# Patient Record
Sex: FEMALE
[De-identification: names, ages, dates, MRNs, and addresses within clinical notes are randomized per-mention and may not be internally consistent; named-entity substitution may affect disease eponyms.]

---

## 2017-01-12 PROBLEM — R51 HEADACHE: Status: ACTIVE | Noted: 2017-01-12

## 2017-01-12 PROBLEM — Z86.69 H/O HYDROCEPHALUS: Status: ACTIVE | Noted: 2017-01-12

## 2017-01-13 ENCOUNTER — APPOINTMENT (OUTPATIENT)
Dept: CT IMAGING | Facility: HOSPITAL | Age: 47
End: 2017-01-13

## 2017-01-13 ENCOUNTER — OUTPATIENT (OUTPATIENT)
Dept: OUTPATIENT SERVICES | Facility: HOSPITAL | Age: 47
LOS: 1 days | Discharge: HOME | End: 2017-01-13
Payer: COMMERCIAL

## 2017-01-13 DIAGNOSIS — Z86.69 PERSONAL HISTORY OF OTHER DISEASES OF THE NERVOUS SYSTEM AND SENSE ORGANS: ICD-10-CM

## 2017-01-13 PROCEDURE — 70450 CT HEAD/BRAIN W/O DYE: CPT | Mod: 26

## 2017-01-18 ENCOUNTER — APPOINTMENT (OUTPATIENT)
Dept: SPINE | Facility: CLINIC | Age: 47
End: 2017-01-18

## 2017-01-18 VITALS
HEIGHT: 67 IN | DIASTOLIC BLOOD PRESSURE: 90 MMHG | BODY MASS INDEX: 26.53 KG/M2 | WEIGHT: 169 LBS | SYSTOLIC BLOOD PRESSURE: 150 MMHG

## 2017-01-18 DIAGNOSIS — M54.2 CERVICALGIA: ICD-10-CM

## 2017-01-19 ENCOUNTER — TRANSCRIPTION ENCOUNTER (OUTPATIENT)
Age: 47
End: 2017-01-19

## 2017-08-09 ENCOUNTER — INPATIENT (INPATIENT)
Facility: HOSPITAL | Age: 47
LOS: 5 days | Discharge: HOME | End: 2017-08-15
Attending: INTERNAL MEDICINE | Admitting: INTERNAL MEDICINE
Payer: COMMERCIAL

## 2017-08-09 VITALS
DIASTOLIC BLOOD PRESSURE: 95 MMHG | HEART RATE: 78 BPM | RESPIRATION RATE: 18 BRPM | TEMPERATURE: 98 F | SYSTOLIC BLOOD PRESSURE: 185 MMHG | OXYGEN SATURATION: 98 %

## 2017-08-09 LAB
ALBUMIN SERPL ELPH-MCNC: 3.7 G/DL — SIGNIFICANT CHANGE UP (ref 3.3–5)
ALP SERPL-CCNC: 122 U/L — HIGH (ref 40–120)
ALT FLD-CCNC: 14 U/L — SIGNIFICANT CHANGE UP (ref 12–78)
ANION GAP SERPL CALC-SCNC: 22 MMOL/L — HIGH (ref 5–17)
APTT BLD: 35.4 SEC — SIGNIFICANT CHANGE UP (ref 27.5–37.4)
AST SERPL-CCNC: 21 U/L — SIGNIFICANT CHANGE UP (ref 15–37)
BASE EXCESS BLDA CALC-SCNC: -6.3 MMOL/L — LOW (ref -2–2)
BILIRUB SERPL-MCNC: 0.3 MG/DL — SIGNIFICANT CHANGE UP (ref 0.2–1.2)
BLOOD GAS COMMENTS: SIGNIFICANT CHANGE UP
BLOOD GAS COMMENTS: SIGNIFICANT CHANGE UP
BLOOD GAS SOURCE: SIGNIFICANT CHANGE UP
BUN SERPL-MCNC: 166 MG/DL — HIGH (ref 7–23)
CALCIUM SERPL-MCNC: 5.4 MG/DL — CRITICAL LOW (ref 8.5–10.1)
CHLORIDE SERPL-SCNC: 98 MMOL/L — SIGNIFICANT CHANGE UP (ref 96–108)
CO2 SERPL-SCNC: 18 MMOL/L — LOW (ref 22–31)
CREAT SERPL-MCNC: 20.6 MG/DL — HIGH (ref 0.5–1.3)
ERYTHROCYTE [SEDIMENTATION RATE] IN BLOOD: 74 MM/HR — HIGH (ref 0–15)
GLUCOSE SERPL-MCNC: 101 MG/DL — HIGH (ref 70–99)
HCG SERPL-ACNC: 3 MIU/ML — SIGNIFICANT CHANGE UP
HCO3 BLDA-SCNC: 18 MMOL/L — LOW (ref 21–29)
HCT VFR BLD CALC: 23.5 % — LOW (ref 34.5–45)
HGB BLD-MCNC: 7.3 G/DL — LOW (ref 11.5–15.5)
HOROWITZ INDEX BLDA+IHG-RTO: 21 — SIGNIFICANT CHANGE UP
INR BLD: 1.1 RATIO — SIGNIFICANT CHANGE UP (ref 0.88–1.16)
MAGNESIUM SERPL-MCNC: 1.8 MG/DL — SIGNIFICANT CHANGE UP (ref 1.6–2.6)
MCHC RBC-ENTMCNC: 27.3 PG — SIGNIFICANT CHANGE UP (ref 27–34)
MCHC RBC-ENTMCNC: 31.2 GM/DL — LOW (ref 32–36)
MCV RBC AUTO: 87.7 FL — SIGNIFICANT CHANGE UP (ref 80–100)
PCO2 BLDA: 32 MMHG — SIGNIFICANT CHANGE UP (ref 32–46)
PH BLD: 7.37 — SIGNIFICANT CHANGE UP (ref 7.35–7.45)
PLATELET # BLD AUTO: 148 K/UL — LOW (ref 150–400)
PO2 BLDA: 83 MMHG — SIGNIFICANT CHANGE UP (ref 74–108)
POTASSIUM SERPL-MCNC: 3.1 MMOL/L — LOW (ref 3.5–5.3)
POTASSIUM SERPL-SCNC: 3.1 MMOL/L — LOW (ref 3.5–5.3)
PROT SERPL-MCNC: 7 GM/DL — SIGNIFICANT CHANGE UP (ref 6–8.3)
PROTHROM AB SERPL-ACNC: 12 SEC — SIGNIFICANT CHANGE UP (ref 9.8–12.7)
RBC # BLD: 2.68 M/UL — LOW (ref 3.8–5.2)
RBC # FLD: 15.5 % — HIGH (ref 11–15)
SAO2 % BLDA: 96 % — SIGNIFICANT CHANGE UP (ref 92–96)
SODIUM SERPL-SCNC: 138 MMOL/L — SIGNIFICANT CHANGE UP (ref 135–145)
TROPONIN I SERPL-MCNC: 0.09 NG/ML — HIGH (ref 0.01–0.04)
WBC # BLD: 10 K/UL — SIGNIFICANT CHANGE UP (ref 3.8–10.5)
WBC # FLD AUTO: 10 K/UL — SIGNIFICANT CHANGE UP (ref 3.8–10.5)

## 2017-08-09 PROCEDURE — 99285 EMERGENCY DEPT VISIT HI MDM: CPT

## 2017-08-09 PROCEDURE — 70450 CT HEAD/BRAIN W/O DYE: CPT | Mod: 26

## 2017-08-09 PROCEDURE — 72125 CT NECK SPINE W/O DYE: CPT | Mod: 26

## 2017-08-09 PROCEDURE — 76377 3D RENDER W/INTRP POSTPROCES: CPT | Mod: 26

## 2017-08-09 PROCEDURE — 71010: CPT | Mod: 26

## 2017-08-09 RX ORDER — CALCIUM GLUCONATE 100 MG/ML
1 VIAL (ML) INTRAVENOUS ONCE
Qty: 1 | Refills: 0 | Status: COMPLETED | OUTPATIENT
Start: 2017-08-09 | End: 2017-08-09

## 2017-08-09 RX ORDER — ACETAMINOPHEN 500 MG
975 TABLET ORAL ONCE
Qty: 0 | Refills: 0 | Status: COMPLETED | OUTPATIENT
Start: 2017-08-09 | End: 2017-08-09

## 2017-08-09 RX ORDER — SODIUM CHLORIDE 9 MG/ML
1000 INJECTION INTRAMUSCULAR; INTRAVENOUS; SUBCUTANEOUS
Qty: 0 | Refills: 0 | Status: DISCONTINUED | OUTPATIENT
Start: 2017-08-09 | End: 2017-08-10

## 2017-08-09 RX ORDER — HYDRALAZINE HCL 50 MG
100 TABLET ORAL ONCE
Qty: 0 | Refills: 0 | Status: COMPLETED | OUTPATIENT
Start: 2017-08-09 | End: 2017-08-09

## 2017-08-09 RX ORDER — POTASSIUM CHLORIDE 20 MEQ
40 PACKET (EA) ORAL ONCE
Qty: 0 | Refills: 0 | Status: COMPLETED | OUTPATIENT
Start: 2017-08-09 | End: 2017-08-09

## 2017-08-09 RX ADMIN — Medication 975 MILLIGRAM(S): at 21:45

## 2017-08-09 RX ADMIN — Medication 200 GRAM(S): at 22:00

## 2017-08-09 RX ADMIN — Medication 975 MILLIGRAM(S): at 20:59

## 2017-08-09 RX ADMIN — SODIUM CHLORIDE 125 MILLILITER(S): 9 INJECTION INTRAMUSCULAR; INTRAVENOUS; SUBCUTANEOUS at 20:50

## 2017-08-09 NOTE — ED PROVIDER NOTE - OBJECTIVE STATEMENT
47 yo F with new onset seizure 1.5 hours ago, while waiting in doctor's office.  Pt. became unresponsive for a minute and was shaking as per witnesses.  Pt. says she's never had a seizure before.  She does admit she has a hx of renal disease and is s/p renal transplant, but she thinks the kidney might be failing.  She notes her pressure is also up because she is due for her evening bp meds.  No other complaints currently.  ROS: negative for fever, cough, headache, chest pain, shortness of breath, abd pain, nausea, vomiting, diarrhea, rash, paresthesia, and weakness.   PMH: CKD stage 5, s/p renal transplant, hypocalcemia, HTN, anemia; Meds: insulin, calcitriol, cholecalciferol, clonidine, darbepoetin aidee, diltiazem 360, furosemide 40, hydralazine 100, metolazone 2.5, metoprolol 50, mycophenolate mofetil 500 bid, spironolactone 25, tacrolimus 5 mg, ; SH: Denies smoking/drinking/drug use

## 2017-08-09 NOTE — ED PROVIDER NOTE - MEDICAL DECISION MAKING DETAILS
47 yo F with new onset seizure, concerning for kidney failure, electrolyte abnormality, stroke  -basic labs, blood gas, coag, hcg, mag level, esr, crp, trop  -ct brain, cervical  -tx htn, hydralazine, clonidine

## 2017-08-09 NOTE — ED ADULT NURSE REASSESSMENT NOTE - NS ED NURSE REASSESS COMMENT FT1
downtime unable to scan pt received potassium 40meq, hydralazine 100mg, clonidine 0.3, Tylenol 975mg, calcium gluconate

## 2017-08-09 NOTE — ED ADULT NURSE NOTE - OBJECTIVE STATEMENT
Pt is 47 y/o female pmh HTN, R kidney transplant (2005) BIBA for seizure at  office. Pt is A&Ox4 and denies hx of seizure.

## 2017-08-09 NOTE — ED PROVIDER NOTE - PHYSICAL EXAMINATION
Vitals: htn 210/110, otherwise wnl  Gen: AAOx3, NAD, sitting comfortably in stretcher, calm and cooperative  Head: ncat, perrla, eomi b/l  Neck: supple, no lymphadenopathy, no midline deviation  Heart: rrr, no m/r/g  Lungs: CTA b/l, no rales/ronchi/wheezes  Abd: soft, nontender, non-distended, no rebound or guarding  Ext: no clubbing/cyanosis/edema  Neuro: sensation and muscle strength intact b/l, steady gait

## 2017-08-10 DIAGNOSIS — N18.6 END STAGE RENAL DISEASE: ICD-10-CM

## 2017-08-10 LAB
ABO RH CONFIRMATION: SIGNIFICANT CHANGE UP
ALBUMIN SERPL ELPH-MCNC: 3 G/DL — LOW (ref 3.3–5)
ALP SERPL-CCNC: 102 U/L — SIGNIFICANT CHANGE UP (ref 40–120)
ALT FLD-CCNC: 13 U/L — SIGNIFICANT CHANGE UP (ref 12–78)
ANION GAP SERPL CALC-SCNC: 10 MMOL/L — SIGNIFICANT CHANGE UP (ref 5–17)
ANION GAP SERPL CALC-SCNC: 20 MMOL/L — HIGH (ref 5–17)
AST SERPL-CCNC: 18 U/L — SIGNIFICANT CHANGE UP (ref 15–37)
BILIRUB SERPL-MCNC: 0.3 MG/DL — SIGNIFICANT CHANGE UP (ref 0.2–1.2)
BLD GP AB SCN SERPL QL: SIGNIFICANT CHANGE UP
BUN SERPL-MCNC: 167 MG/DL — SIGNIFICANT CHANGE UP (ref 7–23)
BUN SERPL-MCNC: 82 MG/DL — HIGH (ref 7–23)
CALCIUM SERPL-MCNC: 6 MG/DL — CRITICAL LOW (ref 8.5–10.1)
CALCIUM SERPL-MCNC: 7 MG/DL — LOW (ref 8.4–10.5)
CALCIUM SERPL-MCNC: 7.7 MG/DL — LOW (ref 8.5–10.1)
CHLORIDE SERPL-SCNC: 100 MMOL/L — SIGNIFICANT CHANGE UP (ref 96–108)
CHLORIDE SERPL-SCNC: 105 MMOL/L — SIGNIFICANT CHANGE UP (ref 96–108)
CO2 SERPL-SCNC: 19 MMOL/L — LOW (ref 22–31)
CO2 SERPL-SCNC: 27 MMOL/L — SIGNIFICANT CHANGE UP (ref 22–31)
CREAT SERPL-MCNC: 10.3 MG/DL — HIGH (ref 0.5–1.3)
CREAT SERPL-MCNC: 20.5 MG/DL — HIGH (ref 0.5–1.3)
CRP SERPL-MCNC: 0.8 MG/DL — HIGH (ref 0–0.4)
FERRITIN SERPL-MCNC: 115 NG/ML — SIGNIFICANT CHANGE UP (ref 15–150)
GLUCOSE SERPL-MCNC: 154 MG/DL — HIGH (ref 70–99)
GLUCOSE SERPL-MCNC: 98 MG/DL — SIGNIFICANT CHANGE UP (ref 70–99)
HAV IGM SER-ACNC: SIGNIFICANT CHANGE UP
HBV CORE IGM SER-ACNC: SIGNIFICANT CHANGE UP
HBV SURFACE AG SER-ACNC: SIGNIFICANT CHANGE UP
HCT VFR BLD CALC: 18.4 % — CRITICAL LOW (ref 34.5–45)
HCT VFR BLD CALC: 19.8 % — CRITICAL LOW (ref 34.5–45)
HCV AB S/CO SERPL IA: 0.09 S/CO — SIGNIFICANT CHANGE UP
HCV AB SERPL-IMP: SIGNIFICANT CHANGE UP
HGB BLD-MCNC: 5.8 G/DL — CRITICAL LOW (ref 11.5–15.5)
HGB BLD-MCNC: 6.2 G/DL — CRITICAL LOW (ref 11.5–15.5)
HIV 1+2 AB+HIV1 P24 AG SERPL QL IA: SIGNIFICANT CHANGE UP
IRON SATN MFR SERPL: 31 UG/DL — SIGNIFICANT CHANGE UP (ref 30–160)
MAGNESIUM SERPL-MCNC: 1.9 MG/DL — SIGNIFICANT CHANGE UP (ref 1.6–2.6)
MCHC RBC-ENTMCNC: 27.3 PG — SIGNIFICANT CHANGE UP (ref 27–34)
MCHC RBC-ENTMCNC: 27.8 PG — SIGNIFICANT CHANGE UP (ref 27–34)
MCHC RBC-ENTMCNC: 31.3 GM/DL — LOW (ref 32–36)
MCHC RBC-ENTMCNC: 31.7 GM/DL — LOW (ref 32–36)
MCV RBC AUTO: 86.1 FL — SIGNIFICANT CHANGE UP (ref 80–100)
MCV RBC AUTO: 88.7 FL — SIGNIFICANT CHANGE UP (ref 80–100)
PHOSPHATE SERPL-MCNC: 6.4 MG/DL — HIGH (ref 2.5–4.5)
PHOSPHATE SERPL-MCNC: 6.5 MG/DL — HIGH (ref 2.5–4.5)
PLATELET # BLD AUTO: 122 K/UL — LOW (ref 150–400)
PLATELET # BLD AUTO: 134 K/UL — LOW (ref 150–400)
POTASSIUM SERPL-MCNC: 2.9 MMOL/L — CRITICAL LOW (ref 3.5–5.3)
POTASSIUM SERPL-MCNC: 3.2 MMOL/L — LOW (ref 3.5–5.3)
POTASSIUM SERPL-SCNC: 2.9 MMOL/L — CRITICAL LOW (ref 3.5–5.3)
POTASSIUM SERPL-SCNC: 3.2 MMOL/L — LOW (ref 3.5–5.3)
PROT SERPL-MCNC: 5.9 GM/DL — LOW (ref 6–8.3)
PTH-INTACT FLD-MCNC: 1467 PG/ML — HIGH (ref 15–65)
RBC # BLD: 2.14 M/UL — LOW (ref 3.8–5.2)
RBC # BLD: 2.23 M/UL — LOW (ref 3.8–5.2)
RBC # FLD: 15.2 % — HIGH (ref 11–15)
RBC # FLD: 15.3 % — HIGH (ref 11–15)
SODIUM SERPL-SCNC: 139 MMOL/L — SIGNIFICANT CHANGE UP (ref 135–145)
SODIUM SERPL-SCNC: 142 MMOL/L — SIGNIFICANT CHANGE UP (ref 135–145)
TRANSFERRIN SERPL-MCNC: 204 MG/DL — SIGNIFICANT CHANGE UP (ref 200–360)
WBC # BLD: 6.9 K/UL — SIGNIFICANT CHANGE UP (ref 3.8–10.5)
WBC # BLD: 6.9 K/UL — SIGNIFICANT CHANGE UP (ref 3.8–10.5)
WBC # FLD AUTO: 6.9 K/UL — SIGNIFICANT CHANGE UP (ref 3.8–10.5)
WBC # FLD AUTO: 6.9 K/UL — SIGNIFICANT CHANGE UP (ref 3.8–10.5)

## 2017-08-10 PROCEDURE — 36556 INSERT NON-TUNNEL CV CATH: CPT

## 2017-08-10 PROCEDURE — 71010: CPT | Mod: 26

## 2017-08-10 PROCEDURE — 93010 ELECTROCARDIOGRAM REPORT: CPT

## 2017-08-10 PROCEDURE — 74176 CT ABD & PELVIS W/O CONTRAST: CPT | Mod: 26

## 2017-08-10 RX ORDER — DESMOPRESSIN ACETATE 0.1 MG/1
0.2 TABLET ORAL ONCE
Qty: 0 | Refills: 0 | Status: DISCONTINUED | OUTPATIENT
Start: 2017-08-10 | End: 2017-08-10

## 2017-08-10 RX ORDER — CALCITRIOL 0.5 UG/1
0.5 CAPSULE ORAL DAILY
Qty: 0 | Refills: 0 | Status: DISCONTINUED | OUTPATIENT
Start: 2017-08-10 | End: 2017-08-15

## 2017-08-10 RX ORDER — DIPHENHYDRAMINE HCL 50 MG
50 CAPSULE ORAL ONCE
Qty: 0 | Refills: 0 | Status: DISCONTINUED | OUTPATIENT
Start: 2017-08-10 | End: 2017-08-10

## 2017-08-10 RX ORDER — MORPHINE SULFATE 50 MG/1
2 CAPSULE, EXTENDED RELEASE ORAL ONCE
Qty: 0 | Refills: 0 | Status: DISCONTINUED | OUTPATIENT
Start: 2017-08-10 | End: 2017-08-10

## 2017-08-10 RX ORDER — ONDANSETRON 8 MG/1
4 TABLET, FILM COATED ORAL ONCE
Qty: 0 | Refills: 0 | Status: COMPLETED | OUTPATIENT
Start: 2017-08-10 | End: 2017-08-10

## 2017-08-10 RX ORDER — ACETAMINOPHEN 500 MG
650 TABLET ORAL EVERY 6 HOURS
Qty: 0 | Refills: 0 | Status: DISCONTINUED | OUTPATIENT
Start: 2017-08-10 | End: 2017-08-15

## 2017-08-10 RX ORDER — DESMOPRESSIN ACETATE 0.1 MG/1
20 TABLET ORAL ONCE
Qty: 0 | Refills: 0 | Status: COMPLETED | OUTPATIENT
Start: 2017-08-10 | End: 2017-08-10

## 2017-08-10 RX ORDER — HYDROMORPHONE HYDROCHLORIDE 2 MG/ML
0.5 INJECTION INTRAMUSCULAR; INTRAVENOUS; SUBCUTANEOUS ONCE
Qty: 0 | Refills: 0 | Status: DISCONTINUED | OUTPATIENT
Start: 2017-08-10 | End: 2017-08-10

## 2017-08-10 RX ORDER — HYDRALAZINE HCL 50 MG
100 TABLET ORAL EVERY 8 HOURS
Qty: 0 | Refills: 0 | Status: DISCONTINUED | OUTPATIENT
Start: 2017-08-10 | End: 2017-08-10

## 2017-08-10 RX ORDER — HYDRALAZINE HCL 50 MG
7.5 TABLET ORAL EVERY 8 HOURS
Qty: 0 | Refills: 0 | Status: DISCONTINUED | OUTPATIENT
Start: 2017-08-10 | End: 2017-08-12

## 2017-08-10 RX ORDER — DIPHENHYDRAMINE HCL 50 MG
25 CAPSULE ORAL ONCE
Qty: 0 | Refills: 0 | Status: COMPLETED | OUTPATIENT
Start: 2017-08-10 | End: 2017-08-10

## 2017-08-10 RX ORDER — FUROSEMIDE 40 MG
20 TABLET ORAL DAILY
Qty: 0 | Refills: 0 | Status: DISCONTINUED | OUTPATIENT
Start: 2017-08-10 | End: 2017-08-12

## 2017-08-10 RX ORDER — HEPARIN SODIUM 5000 [USP'U]/ML
5000 INJECTION INTRAVENOUS; SUBCUTANEOUS EVERY 12 HOURS
Qty: 0 | Refills: 0 | Status: DISCONTINUED | OUTPATIENT
Start: 2017-08-10 | End: 2017-08-15

## 2017-08-10 RX ORDER — POTASSIUM CHLORIDE 20 MEQ
40 PACKET (EA) ORAL ONCE
Qty: 0 | Refills: 0 | Status: COMPLETED | OUTPATIENT
Start: 2017-08-10 | End: 2017-08-10

## 2017-08-10 RX ORDER — POTASSIUM CHLORIDE 20 MEQ
40 PACKET (EA) ORAL EVERY 4 HOURS
Qty: 0 | Refills: 0 | Status: DISCONTINUED | OUTPATIENT
Start: 2017-08-10 | End: 2017-08-10

## 2017-08-10 RX ORDER — TACROLIMUS 5 MG/1
5 CAPSULE ORAL
Qty: 0 | Refills: 0 | Status: DISCONTINUED | OUTPATIENT
Start: 2017-08-10 | End: 2017-08-15

## 2017-08-10 RX ORDER — METOPROLOL TARTRATE 50 MG
50 TABLET ORAL DAILY
Qty: 0 | Refills: 0 | Status: DISCONTINUED | OUTPATIENT
Start: 2017-08-10 | End: 2017-08-12

## 2017-08-10 RX ORDER — MYCOPHENOLATE MOFETIL 250 MG/1
500 CAPSULE ORAL
Qty: 0 | Refills: 0 | Status: DISCONTINUED | OUTPATIENT
Start: 2017-08-10 | End: 2017-08-11

## 2017-08-10 RX ORDER — DOXERCALCIFEROL 2.5 UG/1
3 CAPSULE ORAL ONCE
Qty: 0 | Refills: 0 | Status: COMPLETED | OUTPATIENT
Start: 2017-08-10 | End: 2017-08-10

## 2017-08-10 RX ORDER — SODIUM CHLORIDE 9 MG/ML
1000 INJECTION, SOLUTION INTRAVENOUS
Qty: 0 | Refills: 0 | Status: DISCONTINUED | OUTPATIENT
Start: 2017-08-10 | End: 2017-08-11

## 2017-08-10 RX ADMIN — Medication 40 MILLIEQUIVALENT(S): at 01:00

## 2017-08-10 RX ADMIN — HYDROMORPHONE HYDROCHLORIDE 0.5 MILLIGRAM(S): 2 INJECTION INTRAMUSCULAR; INTRAVENOUS; SUBCUTANEOUS at 21:38

## 2017-08-10 RX ADMIN — Medication 650 MILLIGRAM(S): at 14:06

## 2017-08-10 RX ADMIN — DOXERCALCIFEROL 3 MICROGRAM(S): 2.5 CAPSULE ORAL at 18:04

## 2017-08-10 RX ADMIN — Medication 25 MILLIGRAM(S): at 22:41

## 2017-08-10 RX ADMIN — Medication 650 MILLIGRAM(S): at 07:41

## 2017-08-10 RX ADMIN — SODIUM CHLORIDE 50 MILLILITER(S): 9 INJECTION, SOLUTION INTRAVENOUS at 01:49

## 2017-08-10 RX ADMIN — DESMOPRESSIN ACETATE 220 MICROGRAM(S): 0.1 TABLET ORAL at 22:24

## 2017-08-10 RX ADMIN — SODIUM CHLORIDE 50 MILLILITER(S): 9 INJECTION, SOLUTION INTRAVENOUS at 06:41

## 2017-08-10 RX ADMIN — TACROLIMUS 5 MILLIGRAM(S): 5 CAPSULE ORAL at 06:40

## 2017-08-10 RX ADMIN — Medication 50 MILLIGRAM(S): at 06:40

## 2017-08-10 RX ADMIN — Medication 0.1 MILLIGRAM(S): at 06:41

## 2017-08-10 RX ADMIN — Medication 650 MILLIGRAM(S): at 06:41

## 2017-08-10 RX ADMIN — Medication 100 MILLIGRAM(S): at 21:38

## 2017-08-10 RX ADMIN — MORPHINE SULFATE 2 MILLIGRAM(S): 50 CAPSULE, EXTENDED RELEASE ORAL at 12:43

## 2017-08-10 RX ADMIN — CALCITRIOL 0.5 MICROGRAM(S): 0.5 CAPSULE ORAL at 14:05

## 2017-08-10 RX ADMIN — MORPHINE SULFATE 2 MILLIGRAM(S): 50 CAPSULE, EXTENDED RELEASE ORAL at 12:20

## 2017-08-10 RX ADMIN — Medication 40 MILLIEQUIVALENT(S): at 10:25

## 2017-08-10 RX ADMIN — Medication 0.3 MILLIGRAM(S): at 00:59

## 2017-08-10 RX ADMIN — MYCOPHENOLATE MOFETIL 500 MILLIGRAM(S): 250 CAPSULE ORAL at 17:31

## 2017-08-10 RX ADMIN — Medication 650 MILLIGRAM(S): at 15:00

## 2017-08-10 RX ADMIN — Medication 100 MILLIGRAM(S): at 01:00

## 2017-08-10 RX ADMIN — MYCOPHENOLATE MOFETIL 500 MILLIGRAM(S): 250 CAPSULE ORAL at 06:40

## 2017-08-10 RX ADMIN — HEPARIN SODIUM 5000 UNIT(S): 5000 INJECTION INTRAVENOUS; SUBCUTANEOUS at 17:32

## 2017-08-10 RX ADMIN — Medication 25 MILLIGRAM(S): at 21:50

## 2017-08-10 RX ADMIN — TACROLIMUS 5 MILLIGRAM(S): 5 CAPSULE ORAL at 17:31

## 2017-08-10 RX ADMIN — Medication 650 MILLIGRAM(S): at 20:22

## 2017-08-10 RX ADMIN — HYDROMORPHONE HYDROCHLORIDE 0.5 MILLIGRAM(S): 2 INJECTION INTRAMUSCULAR; INTRAVENOUS; SUBCUTANEOUS at 21:53

## 2017-08-10 RX ADMIN — Medication 100 MILLIGRAM(S): at 06:41

## 2017-08-10 RX ADMIN — Medication 20 MILLIGRAM(S): at 06:41

## 2017-08-10 RX ADMIN — ONDANSETRON 4 MILLIGRAM(S): 8 TABLET, FILM COATED ORAL at 23:01

## 2017-08-10 RX ADMIN — HEPARIN SODIUM 5000 UNIT(S): 5000 INJECTION INTRAVENOUS; SUBCUTANEOUS at 06:41

## 2017-08-10 RX ADMIN — Medication 0.1 MILLIGRAM(S): at 17:31

## 2017-08-10 RX ADMIN — Medication 650 MILLIGRAM(S): at 21:23

## 2017-08-10 RX ADMIN — Medication 100 MILLIGRAM(S): at 14:09

## 2017-08-10 NOTE — CONSULT NOTE ADULT - SUBJECTIVE AND OBJECTIVE BOX
HPI:  She has had a  donor renal transplant for 11 years.  ESRD is due to HTN.  She has no other significant medical history.  She states the kidney has been failing lately and she has been urged to resume dialysis by her nephrologist, Dr. Donald Cali, for past 2 months or so.  She was in his office today when she started to have shaking and EMS brought her to ER where she arrived awake and coherent and there was no further seizure like activity.  SBP was in 220/110 mm Hg.  She cannot say what her recent creatinine levels were except that it was "very high".  She was also severely hypocalcemic.  She is on calcitriol for this but no calcium supplementation.   She denies any history of acute rejection or any recent renal biopsies.       ROS:  She denies CP, SOB, HA, dizziness, syncope, abdominal pain and admits to anorexia, 5# weight loss, nausea, vomiting, and metallic taste.  A total of 10 systems were reviewed and remainder are negative.      PAST MEDICAL & SURGICAL HISTORY:  History of renal transplant  ESRD (end stage renal disease)  HTN - Hypertension  Tubal ligation status: 12 years ago  History of renal transplant      SOCIAL HISTORY:  never smoked  nursing degree    FAMILY HISTORY:  no kidney disease, but HTN runs in 1st degree relatives      MEDICATIONS  (STANDING):  acetaminophen   Tablet. 975 milliGRAM(s) Oral once  hydrALAZINE 100 milliGRAM(s) Oral Once  cloNIDine 0.3 milliGRAM(s) Oral Once  sodium chloride 0.9%. 1000 milliLiter(s) (125 mL/Hr) IV Continuous <Continuous>  calcium gluconate IVPB 1 Gram(s) IV Intermittent Once  potassium chloride    Tablet ER 40 milliEquivalent(s) Oral once      PHYSICAL EXAMINATION:  T(F): 98 (17 @ 22:57)  HR: 78 (17 @ 22:57)  BP: 185/95 (17 @ 22:57)  RR: 18 (17 @ 22:57)  SpO2: 98% (17 @ 22:57)  Conversant, no apparent distress  PERRLA, pink conjunctivae, no ptosis  Good dentition, no pharyngeal erythema, uremic fetor is present  Neck non tender, no mass, no thyromegaly or nodules  Normal respiratory effort, lungs clear to auscultation  Heart with RRR, loud systolic murmur,no pericardial rub, 1+ pretibial edema  Abdomen soft, no masses, no organomegaly  Skin no rashes, ulcers or lesions, normal turgor and temperature  Appropriate affect, AO x 3  No asterixis or myoclonic jerks    LABS:                        7.3    10.0  )-----------( 148      ( 09 Aug 2017 21:01 )             23.5         138  |  98  |  166<H>  ----------------------------<  101<H>  3.1<L>   |  18<L>  |  20.60<H>    Ca    5.4<LL>      09 Aug 2017 21:01  Mg     1.8         TPro  7.0  /  Alb  3.7  /  TBili  0.3  /  DBili  x   /  AST  21  /  ALT  14  /  AlkPhos  122<H>        RADIOLOGY:  Chest X-Ray personally reviewed and shows slight prominence of pulmonary vasculature, slight prominence of cardiac silohette  CT brain shows no acute abnormality    ASSESSMENT:  1.  Renal transplant failure, chronic by history  2.  Uncontrolled HTN  3.  Severe hypocalcemia  4.  Seizure probably most due to #2 and #3    PLAN:  She was dosed with oral hydralazine and clonidine and BP is moderating; continue to dose these orally;  goal -170 mm Hg for tonight is reasonable  Continue to supplement calcium intravenously as ordered  Discontinue saline  Continue anti rejection drugs at usual dosages  She has agreed to start dialysis;  please arrange for nasra catheter placement over night so she be dialyzed early tomorrow  Vascular surgery can be called tomorrow for tunneled dialysis catheter and AV access placement (she prefers HD > PD)            This consultation involves high risk medical decision making as evidenced by a severe exacerbation of chronic illness, illness with threat to life or bodily function, decision for DNR or to de-escalate care, or use of drugs that require intensive monitoring for toxicity.

## 2017-08-10 NOTE — H&P ADULT - HISTORY OF PRESENT ILLNESS
47 Y/O female w/ PMHx of HTN, CRF s/p kidney transplant 2005 presents s/p seizure x3 at doctors office. Pt reports no PMHx of seizures, and states that she doesn't recall how she felt prior to or recall having the seizures. She states that she had increased weakness and dizziness throughout the week. According to the pt, she was visiting her doctors office because they were planning to put her on HD. ICU called to assist with new HD.

## 2017-08-10 NOTE — CHART NOTE - NSCHARTNOTEFT_GEN_A_CORE
Patient is a 46y old  Female who presents with a chief complaint of c/o new onset seizure (10 Aug 2017 05:09). Contacted by RN for reaction 7 minutes status post IV Dilaudid administered to Right antecubital heplock. Patient with restlessness, faint, moderate SOB and flushing.      HPI:  45 Y/O female w/ PMHx of HTN, CRF s/p kidney transplant 2005 presents s/p seizure x3 at doctors office. Pt reports no PMHx of seizures, and states that she doesn't recall how she felt prior to or recall having the seizures. She states that she had increased weakness and dizziness throughout the week. According to the pt, she was visiting her doctors office because they were planning to put her on HD. ICU called to assist with new HD. (10 Aug 2017 03:52)      Allergies    Dilaudid (Flushing (Severe); Short breath (Mild to Mod); Faint (Mod to Severe))    Intolerances        MEDICATIONS  (STANDING):  dextrose 5% 1000 milliLiter(s) (50 mL/Hr) IV Continuous <Continuous>  calcitriol   Capsule 0.5 MICROGram(s) Oral daily  mycophenolate mofetil 500 milliGRAM(s) Oral two times a day  tacrolimus 5 milliGRAM(s) Oral two times a day  cloNIDine 0.1 milliGRAM(s) Oral two times a day  hydrALAZINE 100 milliGRAM(s) Oral every 8 hours  metoprolol succinate ER 50 milliGRAM(s) Oral daily  furosemide    Tablet 20 milliGRAM(s) Oral daily  heparin  Injectable 5000 Unit(s) SubCutaneous every 12 hours  desmopressin IVPB 20 MICROGram(s) IV Intermittent once    MEDICATIONS  (PRN):  acetaminophen   Tablet. 650 milliGRAM(s) Oral every 6 hours PRN Mild Pain (1 - 3)  acetaminophen   Tablet. 650 milliGRAM(s) Oral every 6 hours PRN Mild Pain (1 - 3)      Drug Dosing Weight  Height (cm): 170.2 (10 Aug 2017 04:50)  Weight (kg): 73.5 (10 Aug 2017 04:50)  BMI (kg/m2): 25.4 (10 Aug 2017 04:50)  BSA (m2): 1.85 (10 Aug 2017 04:50)    PAST MEDICAL & SURGICAL HISTORY:  Cerebral edema  History of renal transplant  ESRD (end stage renal disease)  HTN - Hypertension  Tubal ligation status: 12 years ago  History of renal transplant      REVIEW OF SYSTEMS      General:	restless, flushing    Skin/Breast: + puritus, denies rash  	  Respiratory and Thorax: denies wheezing  	  Cardiovascular:	[id number] chest pain          ICU Vital Signs Last 24 Hrs  T(C): 36.8 (10 Aug 2017 19:25), Max: 37.2 (10 Aug 2017 15:10)  T(F): 98.2 (10 Aug 2017 19:25), Max: 99 (10 Aug 2017 15:10)  HR: 72 (10 Aug 2017 21:00) (62 - 86)  BP: 177/99 (10 Aug 2017 21:00) (155/94 - 194/108)  BP(mean): 118 (10 Aug 2017 21:00) (102 - 130)  ABP: --  ABP(mean): --  RR: 22 (10 Aug 2017 21:00) (10 - 22)  SpO2: 97% (10 Aug 2017 21:00) (96% - 100%)                I&O's Detail    09 Aug 2017 07:01  -  10 Aug 2017 07:00  --------------------------------------------------------  IN:    dextrose 5%: 200 mL  Total IN: 200 mL    OUT:  Total OUT: 0 mL    Total NET: 200 mL      10 Aug 2017 07:01  -  10 Aug 2017 22:19  --------------------------------------------------------  IN:    dextrose 5%: 750 mL    Oral Fluid: 225 mL    Other: 1150 mL    Packed Red Blood Cells: 320 mL  Total IN: 2445 mL    OUT:    Other: 1150 mL    Voided: 500 mL  Total OUT: 1650 mL    Total NET: 795 mL          PHYSICAL EXAM:      Constitutional: anxious    Eyes: no periorbital edema    ENMT: EOMI, pharynx clear, right IJ  shiley with clotting, moderate bleeding    Neck: supple      Respiratory: clear to auscultation    Cardiovascular: S1/S2      Extremities: no edema    Vascular: positive pedal pulses      Skin: warm intact          LABS:  CBC Full  -  ( 10 Aug 2017 07:10 )  WBC Count : 6.9 K/uL  Hemoglobin : 6.2 g/dL  Hematocrit : 19.8 %  Platelet Count - Automated : 134 K/uL  Mean Cell Volume : 88.7 fl  Mean Cell Hemoglobin : 27.8 pg  Mean Cell Hemoglobin Concentration : 31.3 gm/dL      08-10    142  |  105  |  82<H>  ----------------------------<  154<H>  3.2<L>   |  27  |  10.30<H>    Ca    7.7<L>      10 Aug 2017 19:27  Phos  6.5     08-10  Mg     1.9     08-10    TPro  5.9<L>  /  Alb  3.0<L>  /  TBili  0.3  /  DBili  x   /  AST  18  /  ALT  13  /  AlkPhos  102  08-10    CAPILLARY BLOOD GLUCOSE        PT/INR - ( 09 Aug 2017 21:01 )   PT: 12.0 sec;   INR: 1.10 ratio         PTT - ( 09 Aug 2017 21:01 )  PTT:35.4 sec        CRITICAL CARE TIME SPENT: 20 minutes      Allergic drug reaction- Benadryl 25 mg IVP                                   d/c dilaudid                                  urine for immunofixation    Uremic Bleeding- Pressure dressing                            DDVAP ordered by critical cxare intensivist

## 2017-08-10 NOTE — H&P ADULT - PROBLEM SELECTOR PLAN 1
1. Admit pt to CCU under Dr. Elias hoover/ hospitalist service for medicine.  2. f/u renal consult from Dr. Wong  3. initiate HD  4. Continue home BP meds  5. repeat labs, replace electrolytes as needed

## 2017-08-10 NOTE — PROCEDURE NOTE - NSPOSTPRCRAD_GEN_A_CORE
central line located in the superior vena cava/depth of insertion/central line located in the/no pneumothorax/post-procedure radiography performed

## 2017-08-10 NOTE — H&P ADULT - ASSESSMENT
45 Y/O female w/ hx of CRF s/p kidney transplant which pt states has been failing for 2 months, requiring HD. Pt noted to be hypocalcemic so calcium ordered by nephrologist in ED.  BP noted to be elevated to 210/110 in ED.

## 2017-08-10 NOTE — H&P ADULT - ATTENDING COMMENTS
47 yo female w/ hx HTN, CKD s/p kidney transplant 2005 admitted for seizures. Pt with worsening renal transplant function and has also been hypertensive most recently . Had seizures most likely to HTN encephalopathy or uremic encephalopathy given degree of renal dysfunction. Pt doing well now and BP controlled with medications. HD cath being placed and plan for HD today. Cont BP meds. Cont immunosuppression

## 2017-08-10 NOTE — H&P ADULT - NSHPPHYSICALEXAM_GEN_ALL_CORE
PHYSICAL EXAM:    GENERAL: NAD, well-groomed, well-developed  HEAD:  Atraumatic, Normocephalic  EYES: EOMI, PERRLA, conjunctiva and sclera clear  ENMT: No tonsillar erythema, exudates, or enlargement; Moist mucous membranes, Good dentition, No lesions  NECK: Supple, No JVD, Normal thyroid  NERVOUS SYSTEM:  Alert & Oriented X3, Good concentration; Motor Strength 5/5 B/L upper and lower extremities; DTRs 2+ intact and symmetric  CHEST/LUNG: Clear to percussion bilaterally; No rales, rhonchi, wheezing, or rubs  HEART: Regular rate and rhythm; No murmurs, rubs, or gallops  ABDOMEN: Soft, Nontender, Nondistended; Bowel sounds present  EXTREMITIES:  2+ Peripheral Pulses, No clubbing, cyanosis, or edema  LYMPH: No lymphadenopathy noted  SKIN: No rashes or lesions PHYSICAL EXAM:    GENERAL: NAD, well-groomed, well-developed  HEAD:  Atraumatic, Normocephalic  EYES: EOMI, PERRLA, conjunctiva and sclera clear  ENMT: No tonsillar erythema, exudates, or enlargement; Moist mucous membranes, Good dentition, No lesions  NECK: Supple, No JVD, Normal thyroid  NERVOUS SYSTEM:  Alert & Oriented X3, Good concentration  CHEST/LUNG: Clear to percussion bilaterally; No rales, rhonchi, wheezing, or rubs  HEART: Regular rate and rhythm; No murmurs, rubs, or gallops  ABDOMEN: Soft, Nontender, Nondistended; Bowel sounds present  EXTREMITIES:  2+ Peripheral Pulses, No clubbing, cyanosis, or edema

## 2017-08-10 NOTE — PROCEDURE NOTE - NSPROCDETAILS_GEN_ALL_CORE
sterile dressing applied/lumen(s) aspirated and flushed/ultrasound guidance/guidewire recovered/sterile technique, catheter placed

## 2017-08-10 NOTE — H&P ADULT - PMH
Cerebral edema    ESRD (end stage renal disease)    History of renal transplant    HTN - Hypertension

## 2017-08-10 NOTE — H&P ADULT - NSHPREVIEWOFSYSTEMS_GEN_ALL_CORE
REVIEW OF SYSTEMS:    CONSTITUTIONAL: + generalized weakness  EYES: No eye pain, visual disturbances, or discharge  ENMT:  No difficulty hearing, tinnitus, vertigo; No sinus or throat pain  NECK: No pain or stiffness  BREASTS: No pain, masses, or nipple discharge  RESPIRATORY: No cough, wheezing, chills or hemoptysis; No shortness of breath  CARDIOVASCULAR: No chest pain, palpitations, dizziness, or leg swelling  GASTROINTESTINAL: No abdominal or epigastric pain. No nausea, vomiting, or hematemesis; No diarrhea or constipation. No melena or hematochezia.  GENITOURINARY: No dysuria, frequency, hematuria, or incontinence  NEUROLOGICAL: No headaches, memory loss, loss of strength, numbness, or tremors  SKIN: No itching, burning, rashes, or lesions   LYMPH NODES: No enlarged glands REVIEW OF SYSTEMS:    CONSTITUTIONAL: + generalized weakness  EYES: No eye pain, visual disturbances, or discharge  ENMT:  No difficulty hearing, tinnitus, vertigo; No sinus or throat pain  NECK: No pain or stiffness  BREASTS: No pain, masses, or nipple discharge  RESPIRATORY: No cough, wheezing, chills or hemoptysis; No shortness of breath  CARDIOVASCULAR: No chest pain, palpitations, dizziness, or leg swelling  GASTROINTESTINAL: No abdominal or epigastric pain. No nausea, vomiting, or hematemesis; No diarrhea or constipation. No melena or hematochezia.  GENITOURINARY: No dysuria, frequency, hematuria, or incontinence  NEUROLOGICAL: + dizziness, no memory loss, loss of strength, numbness, or tremors  SKIN: No itching, burning, rashes, or lesions   LYMPH NODES: No enlarged glands

## 2017-08-10 NOTE — PROCEDURE NOTE - NSPOSTCAREGUIDE_GEN_A_CORE
Instructed patient/caregiver regarding signs and symptoms of infection/Verbal/written post procedure instructions were given to patient/caregiver/Care for catheter as per unit/ICU protocols/Keep the cast/splint/dressing clean and dry

## 2017-08-10 NOTE — PROGRESS NOTE ADULT - SUBJECTIVE AND OBJECTIVE BOX
Subjective: 45yo F presented last night after a witnessed episode of seizure like activity while in the office of Dr Cali/Nephrology. Has an 11 yo cadaveric renal transplant that has been failing progressively over past few months. She has been experiencing persistent nausea, fatigue.  Transplant Bx was not entertained as a diagnostic modality. Severely hypertensive in ED. Bun/Cr 160/20.   BP control has improved back on reg meds. No nausea at present. C/o fatigue        MEDICATIONS  (STANDING):  dextrose 5% 1000 milliLiter(s) (50 mL/Hr) IV Continuous <Continuous>  calcitriol   Capsule 0.5 MICROGram(s) Oral daily  mycophenolate mofetil 500 milliGRAM(s) Oral two times a day  tacrolimus 5 milliGRAM(s) Oral two times a day  cloNIDine 0.1 milliGRAM(s) Oral two times a day  hydrALAZINE 100 milliGRAM(s) Oral every 8 hours  metoprolol succinate ER 50 milliGRAM(s) Oral daily  furosemide    Tablet 20 milliGRAM(s) Oral daily  heparin  Injectable 5000 Unit(s) SubCutaneous every 12 hours  potassium chloride    Tablet ER 40 milliEquivalent(s) Oral every 4 hours    MEDICATIONS  (PRN):  acetaminophen   Tablet. 650 milliGRAM(s) Oral every 6 hours PRN Mild Pain (1 - 3)          T(C): 36.4 (08-10-17 @ 07:48), Max: 37.1 (08-10-17 @ 02:00)  HR: 74 (08-10-17 @ 07:40) (64 - 82)  BP: 170/90 (08-10-17 @ 07:30) (168/89 - 185/95)  RR: 13 (08-10-17 @ 07:40) (10 - 21)  SpO2: 100% (08-10-17 @ 07:40) (97% - 100%)  Wt(kg): --    ABG - ( 09 Aug 2017 21:05 )  pH: x     /  pCO2: 32    /  pO2: 83    / HCO3: 18    / Base Excess: -6.3  /  SaO2: 96                  I&O's Detail    09 Aug 2017 07:01  -  10 Aug 2017 07:00  --------------------------------------------------------  IN:    dextrose 5%: 200 mL  Total IN: 200 mL    OUT:  Total OUT: 0 mL    Total NET: 200 mL               PHYSICAL EXAM:    GENERAL: anxious  EYES: EOMI, PERRLA, conjunctiva and sclera clear  NECK: Supple, no inc in JVP  CHEST/LUNG: Clear  HEART: S1S2, pos rub  ABDOMEN: Soft, non-tender R pelvic allograft  EXTREMITIES:  no edema  NEURO: pos asterixis      LABS:  CBC Full  -  ( 10 Aug 2017 07:10 )  WBC Count : 6.9 K/uL  Hemoglobin : 6.2 g/dL  Hematocrit : 19.8 %  Platelet Count - Automated : 134 K/uL  Mean Cell Volume : 88.7 fl  Mean Cell Hemoglobin : 27.8 pg  Mean Cell Hemoglobin Concentration : 31.3 gm/dL  Auto Neutrophil # : x  Auto Lymphocyte # : x  Auto Monocyte # : x  Auto Eosinophil # : x  Auto Basophil # : x  Auto Neutrophil % : x  Auto Lymphocyte % : x  Auto Monocyte % : x  Auto Eosinophil % : x  Auto Basophil % : x    08-10    139  |  100  |  167  ----------------------------<  98  2.9<LL>   |  19<L>  |  20.50<H>    Ca    6.0<LL>      10 Aug 2017 05:19  Phos  6.4     08-10  Mg     1.9     08-10    TPro  5.9<L>  /  Alb  3.0<L>  /  TBili  0.3  /  DBili  x   /  AST  18  /  ALT  13  /  AlkPhos  102  08-10    PT/INR - ( 09 Aug 2017 21:01 )   PT: 12.0 sec;   INR: 1.10 ratio         PTT - ( 09 Aug 2017 21:01 )  PTT:35.4 sec        ASSESSMENT and PLAN:    * Failed cadaveric transplant, uremic, hypertensive seizure -- resume HD via temporary access. 2.5H tx today. No UF. Obtain non-contrast CT A/P to eval renal anatomy. Will contact Dr Cali to obtain renal hx [phone])  * HypoK -- avoid aggressive supplementation in ESRD. Will give 20meq KCL once. Adjust K bath in HD  * Hypertensive emergency -- BP improved and acceptable at this time. Maintain -180 for next 24h  * HypoCa of ESRD -- cont to supplement PRN. Start Vit D with HD. Check PTH, Phos  * Anemia of ESRD -- check Fe studies, SPEP, UPEP. Transfuse with dialysis today. SERG once BP control is consistently better.

## 2017-08-11 LAB
% ALBUMIN: 59.6 % — SIGNIFICANT CHANGE UP
% ALPHA 1: 6.5 % — SIGNIFICANT CHANGE UP
% ALPHA 2: 10.3 % — SIGNIFICANT CHANGE UP
% BETA: 9.6 % — SIGNIFICANT CHANGE UP
% GAMMA: 14 % — SIGNIFICANT CHANGE UP
ALBUMIN SERPL ELPH-MCNC: 3.3 G/DL — LOW (ref 3.6–5.5)
ALBUMIN/GLOB SERPL ELPH: 1.5 RATIO — SIGNIFICANT CHANGE UP
ALPHA1 GLOB SERPL ELPH-MCNC: 0.4 G/DL — SIGNIFICANT CHANGE UP (ref 0.1–0.4)
ALPHA2 GLOB SERPL ELPH-MCNC: 0.6 G/DL — SIGNIFICANT CHANGE UP (ref 0.5–1)
ANION GAP SERPL CALC-SCNC: 14 MMOL/L — SIGNIFICANT CHANGE UP (ref 5–17)
B-GLOBULIN SERPL ELPH-MCNC: 0.5 G/DL — SIGNIFICANT CHANGE UP (ref 0.5–1)
BUN SERPL-MCNC: 96 MG/DL — HIGH (ref 7–23)
CALCIUM SERPL-MCNC: 7.4 MG/DL — LOW (ref 8.5–10.1)
CHLORIDE SERPL-SCNC: 104 MMOL/L — SIGNIFICANT CHANGE UP (ref 96–108)
CO2 SERPL-SCNC: 24 MMOL/L — SIGNIFICANT CHANGE UP (ref 22–31)
CREAT SERPL-MCNC: 13.7 MG/DL — HIGH (ref 0.5–1.3)
GAMMA GLOBULIN: 0.8 G/DL — SIGNIFICANT CHANGE UP (ref 0.6–1.6)
GLUCOSE SERPL-MCNC: 95 MG/DL — SIGNIFICANT CHANGE UP (ref 70–99)
HCT VFR BLD CALC: 22 % — LOW (ref 34.5–45)
HGB BLD-MCNC: 7 G/DL — CRITICAL LOW (ref 11.5–15.5)
INR BLD: 1.22 RATIO — HIGH (ref 0.88–1.16)
MAGNESIUM SERPL-MCNC: 1.9 MG/DL — SIGNIFICANT CHANGE UP (ref 1.6–2.6)
MCHC RBC-ENTMCNC: 27.8 PG — SIGNIFICANT CHANGE UP (ref 27–34)
MCHC RBC-ENTMCNC: 32.1 GM/DL — SIGNIFICANT CHANGE UP (ref 32–36)
MCV RBC AUTO: 86.8 FL — SIGNIFICANT CHANGE UP (ref 80–100)
PHOSPHATE SERPL-MCNC: 4.4 MG/DL — SIGNIFICANT CHANGE UP (ref 2.5–4.5)
PLATELET # BLD AUTO: 134 K/UL — LOW (ref 150–400)
POTASSIUM SERPL-MCNC: 3.3 MMOL/L — LOW (ref 3.5–5.3)
POTASSIUM SERPL-SCNC: 3.3 MMOL/L — LOW (ref 3.5–5.3)
PROT ?TM UR-MCNC: 332 MG/DL — HIGH (ref 0–12)
PROT PATTERN SERPL ELPH-IMP: SIGNIFICANT CHANGE UP
PROT SERPL-MCNC: 5.5 G/DL — LOW (ref 6–8.3)
PROT SERPL-MCNC: 5.5 G/DL — LOW (ref 6–8.3)
PROTHROM AB SERPL-ACNC: 13.3 SEC — HIGH (ref 9.8–12.7)
RBC # BLD: 2.53 M/UL — LOW (ref 3.8–5.2)
RBC # FLD: 15.7 % — HIGH (ref 11–15)
SODIUM SERPL-SCNC: 142 MMOL/L — SIGNIFICANT CHANGE UP (ref 135–145)
WBC # BLD: 7.2 K/UL — SIGNIFICANT CHANGE UP (ref 3.8–10.5)
WBC # FLD AUTO: 7.2 K/UL — SIGNIFICANT CHANGE UP (ref 3.8–10.5)

## 2017-08-11 PROCEDURE — 99291 CRITICAL CARE FIRST HOUR: CPT

## 2017-08-11 PROCEDURE — 70551 MRI BRAIN STEM W/O DYE: CPT | Mod: 26

## 2017-08-11 PROCEDURE — 99232 SBSQ HOSP IP/OBS MODERATE 35: CPT

## 2017-08-11 RX ORDER — LABETALOL HCL 100 MG
20 TABLET ORAL ONCE
Qty: 0 | Refills: 0 | Status: COMPLETED | OUTPATIENT
Start: 2017-08-11 | End: 2017-08-11

## 2017-08-11 RX ORDER — LABETALOL HCL 100 MG
20 TABLET ORAL ONCE
Qty: 0 | Refills: 0 | Status: DISCONTINUED | OUTPATIENT
Start: 2017-08-11 | End: 2017-08-11

## 2017-08-11 RX ADMIN — Medication 20 MILLIGRAM(S): at 05:28

## 2017-08-11 RX ADMIN — Medication 20 MILLIGRAM(S): at 11:19

## 2017-08-11 RX ADMIN — TACROLIMUS 5 MILLIGRAM(S): 5 CAPSULE ORAL at 17:26

## 2017-08-11 RX ADMIN — Medication 650 MILLIGRAM(S): at 22:46

## 2017-08-11 RX ADMIN — Medication 650 MILLIGRAM(S): at 23:16

## 2017-08-11 RX ADMIN — Medication 0.1 MILLIGRAM(S): at 17:26

## 2017-08-11 RX ADMIN — HEPARIN SODIUM 5000 UNIT(S): 5000 INJECTION INTRAVENOUS; SUBCUTANEOUS at 17:27

## 2017-08-11 RX ADMIN — Medication 7.5 MILLIGRAM(S): at 22:06

## 2017-08-11 RX ADMIN — Medication 50 MILLIGRAM(S): at 05:28

## 2017-08-11 RX ADMIN — MYCOPHENOLATE MOFETIL 500 MILLIGRAM(S): 250 CAPSULE ORAL at 05:28

## 2017-08-11 RX ADMIN — Medication 20 MILLIGRAM(S): at 09:17

## 2017-08-11 RX ADMIN — CALCITRIOL 0.5 MICROGRAM(S): 0.5 CAPSULE ORAL at 12:19

## 2017-08-11 RX ADMIN — TACROLIMUS 5 MILLIGRAM(S): 5 CAPSULE ORAL at 05:28

## 2017-08-11 RX ADMIN — Medication 650 MILLIGRAM(S): at 10:08

## 2017-08-11 RX ADMIN — Medication 7.5 MILLIGRAM(S): at 05:28

## 2017-08-11 RX ADMIN — Medication 650 MILLIGRAM(S): at 11:00

## 2017-08-11 RX ADMIN — Medication 0.1 MILLIGRAM(S): at 05:28

## 2017-08-11 NOTE — CHART NOTE - NSCHARTNOTEFT_GEN_A_CORE
47 Y/O female w/ PMHx of HTN, CRF s/p kidney transplant 2005 presents s/p seizure x3 at doctors office. Pt reports no PMHx of seizures, and states that she doesn't recall how she felt prior to or recall having the seizures. She states that she had increased weakness and dizziness throughout the week. According to the pt, she was visiting her doctors office because they were planning to put her on HD because kidney has been failing x 2 months. ICU called to assist with new HD.     HD initiated while in CCU. Monitoring BP.  Pt placed for transfer to medicine. Pt signed out to Dr. Hernandez.

## 2017-08-11 NOTE — PROGRESS NOTE ADULT - SUBJECTIVE AND OBJECTIVE BOX
Patient seen in follow up for LALITA; CKD 4-5; feels better; no distress.    MEDICATIONS  (STANDING):  calcitriol   Capsule 0.5 MICROGram(s) Oral daily  tacrolimus 5 milliGRAM(s) Oral two times a day  cloNIDine 0.1 milliGRAM(s) Oral two times a day  metoprolol succinate ER 50 milliGRAM(s) Oral daily  furosemide    Tablet 20 milliGRAM(s) Oral daily  heparin  Injectable 5000 Unit(s) SubCutaneous every 12 hours  hydrALAZINE Injectable 7.5 milliGRAM(s) IV Push every 8 hours    MEDICATIONS  (PRN):  acetaminophen   Tablet. 650 milliGRAM(s) Oral every 6 hours PRN Mild Pain (1 - 3)  acetaminophen   Tablet. 650 milliGRAM(s) Oral every 6 hours PRN Mild Pain (1 - 3)    PHYSICAL EXAM:      T(C): 37 (08-11-17 @ 17:11), Max: 37.3 (08-11-17 @ 12:40)  HR: 89 (08-11-17 @ 16:00) (68 - 884)  BP: 170/86 (08-11-17 @ 16:00) (155/84 - 196/103)  RR: 20 (08-11-17 @ 16:00) (13 - 27)  SpO2: 95% (08-11-17 @ 16:00) (92% - 100%)  Wt(kg): --  Respiratory: clear anteriorly, decreased BS at bases  Cardiovascular: S1 S2  Gastrointestinal: soft NT ND +BS  Extremities:  1 edema                                    7.0    7.2   )-----------( 134      ( 11 Aug 2017 03:41 )             22.0     08-11    142  |  104  |  96<H>  ----------------------------<  95  3.3<L>   |  24  |  13.70<H>    Ca    7.4<L>      11 Aug 2017 03:41  Phos  4.4     08-11  Mg     1.9     08-11    TPro  5.5<L>  /  Alb  3.3<L>  /  TBili  x   /  DBili  x   /  AST  x   /  ALT  x   /  AlkPhos  x   08-10    ABG - ( 09 Aug 2017 21:05 )  pH: x     /  pCO2: 32    /  pO2: 83    / HCO3: 18    / Base Excess: -6.3  /  SaO2: 96                LIVER FUNCTIONS - ( 10 Aug 2017 14:38 )  Alb: 3.3 g/dL / Pro: 5.5 g/dL / ALK PHOS: x     / ALT: x     / AST: x     / GGT: x             Assessment and Plan:  CKD 5; failed renal allograft as per history.  HD #2 today; will d/c Cellcept;   HD for am; will require perm cath and outpatient HD arrangement.

## 2017-08-11 NOTE — DIETITIAN INITIAL EVALUATION ADULT. - OTHER INFO
Pt seen for CCU & dialysis.  Pt lives with son & pt does cooking & food shopping PTA.  Pt reports decreased po intake x 2 weeks & noted 9yo cadaveric renal transplant failing progressively over past few months feeling persistent nausea & fatigue. Pt with anemia & hypocalcemia due to ESRD; pt started HD 8/10.  Pt consumed 0% breakfast & currently eating lunch well.  Pt agreed to try Nepro with carb supplement daily. Pt reports no BM x 2 days

## 2017-08-11 NOTE — DIETITIAN INITIAL EVALUATION ADULT. - PERTINENT LABORATORY DATA
08-11 Na 142 mmol/L Glu 95 mg/dL K+ 3.3 mmol/L<L> Cr  13.70 mg/dL<H> BUN 96 mg/dL<H> Phos 4.4 mg/dL Alb n/a   PAB n/a   Hgb 7.0 g/dL<LL> Hct 22.0 %<L>, GFR=3, Alb=3.0(8/10)

## 2017-08-11 NOTE — PROGRESS NOTE ADULT - ASSESSMENT
46 F hx ESRD s/p renal transplant and HTN admitted with seizures. Likely due to uremic or HTN encephalopathy. Pt s/p HD initiation.    Neuro  no further seizures  can check MRI and eeg  likely from PRES or uremia    Renal  doing well with 2nd HD session  appreciate Renal reccs  cont prograf    CVS  cont bp meds  BP has been very elevated lisa after medications and HD   add 3rd drug if still elevated today    Transfer to Floors

## 2017-08-11 NOTE — PROVIDER CONTACT NOTE (CRITICAL VALUE NOTIFICATION) - TEST AND RESULT REPORTED:
H/H 6.2/19.8
ca 5.4 trop 0.091
hemoglobin  of 7
potassium 2.9. calcium 6.0. hemoglobin 5.8. hct 18.4

## 2017-08-11 NOTE — DIETITIAN INITIAL EVALUATION ADULT. - PHYSICAL APPEARANCE
BMI=24.5; +2 gen edema; Nutrition focused physical exam conducted ; found signs of malnutrition [ ]absent [x ]present.  Subcutaneous fat loss: [moderate ] Orbital fat pads region, [WNL ]Buccal fat region, [Moderate ]Triceps region,  [unable ]Ribs region.  Muscle wasting: [moderate ]Temples region, [WNL ]Clavicle region, [WNL ]Shoulder region, [unable ]Scapula region, [WNL ]Interosseous region,  [unable ]thigh region, [unable ]Calf region/well nourished

## 2017-08-11 NOTE — CHART NOTE - NSCHARTNOTEFT_GEN_A_CORE
Upon Nutritional Assessment by the Registered Dietitian your patient was determined to meet criteria / has evidence of the following diagnosis/diagnoses:          [ ]  Mild Protein Calorie Malnutrition        [ ]  Moderate Protein Calorie Malnutrition        [x ] Severe Protein Calorie Malnutrition        [ ] Unspecified Protein Calorie Malnutrition        [ ] Underweight / BMI <19        [ ] Morbid Obesity / BMI > 40      Findings as based on:  •  Comprehensive nutrition assessment and consultation  •  Calorie counts (nutrient intake analysis)  •  Food acceptance and intake status from observations by staff  •  Follow up  •  Patient education  •  Intervention secondary to interdisciplinary rounds  •   concerns      Treatment:    The following diet has been recommended:  Renal/Nepro with carb steady 1 can daily(425kcal & 19gm protein)     PROVIDER Section:     By signing this assessment you are acknowledging and agree with the diagnosis/diagnoses assigned by the Registered Dietitian    Comments:

## 2017-08-11 NOTE — DIETITIAN INITIAL EVALUATION ADULT. - DIET TYPE
8/10/17/DASH/TLC (sodium and cholesterol restricted diet)/renal replacement pts:no protein restr,no conc K & phos, low sodium

## 2017-08-11 NOTE — PROGRESS NOTE ADULT - SUBJECTIVE AND OBJECTIVE BOX
46 F hx ESRD s/p renal transplant and HTN admitted with seizures. Likely due to uremic or HTN encephalopathy. Pt s/p HD initiation 46 F hx ESRD s/p renal transplant and HTN admitted with seizures. Likely due to uremic or HTN encephalopathy. Pt s/p HD initiation.    Pt clinically improved. No further seizures  No SOB, CP  Pt receiving HD again today.   Has small amount of bleeding from HD catheter.     MEDICATIONS  (STANDING):  calcitriol   Capsule 0.5 MICROGram(s) Oral daily  tacrolimus 5 milliGRAM(s) Oral two times a day  cloNIDine 0.1 milliGRAM(s) Oral two times a day  metoprolol succinate ER 50 milliGRAM(s) Oral daily  furosemide    Tablet 20 milliGRAM(s) Oral daily  heparin  Injectable 5000 Unit(s) SubCutaneous every 12 hours  hydrALAZINE Injectable 7.5 milliGRAM(s) IV Push every 8 hours                          7.0    7.2   )-----------( 134      ( 11 Aug 2017 03:41 )             22.0   08-11    142  |  104  |  96<H>  ----------------------------<  95  3.3<L>   |  24  |  13.70<H>    Ca    7.4<L>      11 Aug 2017 03:41  Phos  4.4     08-11  Mg     1.9     08-11    TPro  5.5<L>  /  Alb  3.3<L>  /  TBili  x   /  DBili  x   /  AST  x   /  ALT  x   /  AlkPhos  x   08-10    NAD  b/l cta  s1s2 reg no murmur  soft nontender  AOx3, nfd

## 2017-08-11 NOTE — DIETITIAN INITIAL EVALUATION ADULT. - ETIOLOGY
Inadequate energy intake & increased nutrient needs related to ESRD started HD 8/10, c/o nausea & fatigue

## 2017-08-12 DIAGNOSIS — R56.9 UNSPECIFIED CONVULSIONS: ICD-10-CM

## 2017-08-12 DIAGNOSIS — Z94.0 KIDNEY TRANSPLANT STATUS: ICD-10-CM

## 2017-08-12 DIAGNOSIS — I10 ESSENTIAL (PRIMARY) HYPERTENSION: ICD-10-CM

## 2017-08-12 LAB
ALBUMIN SERPL ELPH-MCNC: 3.2 G/DL — LOW (ref 3.3–5)
ALP SERPL-CCNC: 112 U/L — SIGNIFICANT CHANGE UP (ref 40–120)
ALT FLD-CCNC: 15 U/L — SIGNIFICANT CHANGE UP (ref 12–78)
ANION GAP SERPL CALC-SCNC: 9 MMOL/L — SIGNIFICANT CHANGE UP (ref 5–17)
AST SERPL-CCNC: 24 U/L — SIGNIFICANT CHANGE UP (ref 15–37)
BILIRUB SERPL-MCNC: 0.4 MG/DL — SIGNIFICANT CHANGE UP (ref 0.2–1.2)
BUN SERPL-MCNC: 41 MG/DL — HIGH (ref 7–23)
CALCIUM SERPL-MCNC: 7.8 MG/DL — LOW (ref 8.5–10.1)
CHLORIDE SERPL-SCNC: 105 MMOL/L — SIGNIFICANT CHANGE UP (ref 96–108)
CO2 SERPL-SCNC: 28 MMOL/L — SIGNIFICANT CHANGE UP (ref 22–31)
CREAT SERPL-MCNC: 8.12 MG/DL — HIGH (ref 0.5–1.3)
CREATININE, URINE RESULT: 78 MG/DL — SIGNIFICANT CHANGE UP
GLUCOSE SERPL-MCNC: 132 MG/DL — HIGH (ref 70–99)
HCT VFR BLD CALC: 26.1 % — LOW (ref 34.5–45)
HGB BLD-MCNC: 8.1 G/DL — LOW (ref 11.5–15.5)
IRON SATN MFR SERPL: 19 % — SIGNIFICANT CHANGE UP (ref 14–50)
IRON SATN MFR SERPL: 44 UG/DL — SIGNIFICANT CHANGE UP (ref 30–160)
MAGNESIUM SERPL-MCNC: 2 MG/DL — SIGNIFICANT CHANGE UP (ref 1.6–2.6)
MCHC RBC-ENTMCNC: 27.5 PG — SIGNIFICANT CHANGE UP (ref 27–34)
MCHC RBC-ENTMCNC: 30.9 GM/DL — LOW (ref 32–36)
MCV RBC AUTO: 89 FL — SIGNIFICANT CHANGE UP (ref 80–100)
PHOSPHATE SERPL-MCNC: 3.4 MG/DL — SIGNIFICANT CHANGE UP (ref 2.5–4.5)
PLATELET # BLD AUTO: 123 K/UL — LOW (ref 150–400)
POTASSIUM SERPL-MCNC: 3.6 MMOL/L — SIGNIFICANT CHANGE UP (ref 3.5–5.3)
POTASSIUM SERPL-SCNC: 3.6 MMOL/L — SIGNIFICANT CHANGE UP (ref 3.5–5.3)
PROT ?TM UR-MCNC: 322 MG/DL — HIGH (ref 0–12)
PROT SERPL-MCNC: 6.4 GM/DL — SIGNIFICANT CHANGE UP (ref 6–8.3)
RBC # BLD: 2.93 M/UL — LOW (ref 3.8–5.2)
RBC # FLD: 15.2 % — HIGH (ref 11–15)
SODIUM SERPL-SCNC: 142 MMOL/L — SIGNIFICANT CHANGE UP (ref 135–145)
TIBC SERPL-MCNC: 226 UG/DL — SIGNIFICANT CHANGE UP (ref 220–430)
UIBC SERPL-MCNC: 182 UG/DL — SIGNIFICANT CHANGE UP (ref 110–370)
WBC # BLD: 5.3 K/UL — SIGNIFICANT CHANGE UP (ref 3.8–10.5)
WBC # FLD AUTO: 5.3 K/UL — SIGNIFICANT CHANGE UP (ref 3.8–10.5)

## 2017-08-12 PROCEDURE — 99232 SBSQ HOSP IP/OBS MODERATE 35: CPT

## 2017-08-12 RX ORDER — CARVEDILOL PHOSPHATE 80 MG/1
12.5 CAPSULE, EXTENDED RELEASE ORAL EVERY 12 HOURS
Qty: 0 | Refills: 0 | Status: DISCONTINUED | OUTPATIENT
Start: 2017-08-12 | End: 2017-08-14

## 2017-08-12 RX ORDER — POTASSIUM CHLORIDE 20 MEQ
10 PACKET (EA) ORAL ONCE
Qty: 0 | Refills: 0 | Status: COMPLETED | OUTPATIENT
Start: 2017-08-12 | End: 2017-08-12

## 2017-08-12 RX ORDER — HYDRALAZINE HCL 50 MG
50 TABLET ORAL EVERY 12 HOURS
Qty: 0 | Refills: 0 | Status: DISCONTINUED | OUTPATIENT
Start: 2017-08-12 | End: 2017-08-14

## 2017-08-12 RX ADMIN — Medication 10 MILLIEQUIVALENT(S): at 06:17

## 2017-08-12 RX ADMIN — CALCITRIOL 0.5 MICROGRAM(S): 0.5 CAPSULE ORAL at 17:12

## 2017-08-12 RX ADMIN — HEPARIN SODIUM 5000 UNIT(S): 5000 INJECTION INTRAVENOUS; SUBCUTANEOUS at 17:14

## 2017-08-12 RX ADMIN — Medication 0.1 MILLIGRAM(S): at 17:13

## 2017-08-12 RX ADMIN — Medication 20 MILLIGRAM(S): at 06:22

## 2017-08-12 RX ADMIN — Medication 7.5 MILLIGRAM(S): at 06:18

## 2017-08-12 RX ADMIN — CARVEDILOL PHOSPHATE 12.5 MILLIGRAM(S): 80 CAPSULE, EXTENDED RELEASE ORAL at 17:13

## 2017-08-12 RX ADMIN — TACROLIMUS 5 MILLIGRAM(S): 5 CAPSULE ORAL at 17:12

## 2017-08-12 RX ADMIN — HEPARIN SODIUM 5000 UNIT(S): 5000 INJECTION INTRAVENOUS; SUBCUTANEOUS at 06:18

## 2017-08-12 RX ADMIN — Medication 50 MILLIGRAM(S): at 06:17

## 2017-08-12 RX ADMIN — Medication 50 MILLIGRAM(S): at 17:13

## 2017-08-12 RX ADMIN — Medication 0.1 MILLIGRAM(S): at 06:17

## 2017-08-12 RX ADMIN — TACROLIMUS 5 MILLIGRAM(S): 5 CAPSULE ORAL at 06:18

## 2017-08-12 NOTE — PROGRESS NOTE ADULT - SUBJECTIVE AND OBJECTIVE BOX
Patient seen in follow up for ESRD; HD dependent.  No distress, feels well.    MEDICATIONS  (STANDING):  calcitriol   Capsule 0.5 MICROGram(s) Oral daily  tacrolimus 5 milliGRAM(s) Oral two times a day  heparin  Injectable 5000 Unit(s) SubCutaneous every 12 hours  carvedilol 12.5 milliGRAM(s) Oral every 12 hours  hydrALAZINE 50 milliGRAM(s) Oral every 12 hours  cloNIDine 0.1 milliGRAM(s) Oral every 12 hours    MEDICATIONS  (PRN):  acetaminophen   Tablet. 650 milliGRAM(s) Oral every 6 hours PRN Mild Pain (1 - 3)  acetaminophen   Tablet. 650 milliGRAM(s) Oral every 6 hours PRN Mild Pain (1 - 3)    PHYSICAL EXAM:      T(C): 36.2 (08-12-17 @ 11:07), Max: 37.8 (08-12-17 @ 00:26)  HR: 77 (08-12-17 @ 11:07) (77 - 884)  BP: 142/109 (08-12-17 @ 11:07) (142/82 - 185/99)  RR: 16 (08-12-17 @ 11:07) (16 - 23)  SpO2: 99% (08-12-17 @ 11:07) (94% - 100%)  Wt(kg): --  Respiratory: clear anteriorly, decreased BS at bases  Cardiovascular: S1 S2  Gastrointestinal: soft NT ND +BS  Extremities:  tr edema                                    7.0    7.2   )-----------( 134      ( 11 Aug 2017 03:41 )             22.0     08-11    142  |  104  |  96<H>  ----------------------------<  95  3.3<L>   |  24  |  13.70<H>    Ca    7.4<L>      11 Aug 2017 03:41  Phos  4.4     08-11  Mg     1.9     08-11    TPro  5.5<L>  /  Alb  3.3<L>  /  TBili  x   /  DBili  x   /  AST  x   /  ALT  x   /  AlkPhos  x   08-10      LIVER FUNCTIONS - ( 10 Aug 2017 14:38 )  Alb: 3.3 g/dL / Pro: 5.5 g/dL / ALK PHOS: x     / ALT: x     / AST: x     / GGT: x             Assessment and Plan:    HD today; UF as tolerated.  BP medications adjusted;  Will need perm cath and outpatient HD slot;  Vascular consult.

## 2017-08-12 NOTE — PROGRESS NOTE ADULT - ASSESSMENT
46 F hx ESRD s/p renal transplant and HTN admitted with seizures. Likely due to uremic or HTN encephalopathy. Pt s/p HD initiation.

## 2017-08-12 NOTE — PROGRESS NOTE ADULT - SUBJECTIVE AND OBJECTIVE BOX
CHIEF COMPLAINT/INTERVAL HISTORY:    Patient is a 46y old  Female who presents with a chief complaint of c/o new onset seizure (10 Aug 2017 05:09)      HPI:  47 Y/O female w/ PMHx of HTN, CRF s/p kidney transplant 2005 presents s/p seizure x3 at doctors office. Pt reports no PMHx of seizures, and states that she doesn't recall how she felt prior to or recall having the seizures. She states that she had increased weakness and dizziness throughout the week. According to the pt, she was visiting her doctors office because they were planning to put her on HD. ICU called to assist with new HD. (10 Aug 2017 03:52)    Overnight issues  Patient sitting up in bed  feeling well  No chest pain, SOB  Had HD yesterday          SUBJECTIVE & OBJECTIVE: Pt seen and examined at bedside.   ROS:  CONSTITUTIONAL: No fever, weight loss, or fatigue  EYES: No eye pain, visual disturbances, or discharge  ENMT:  No difficulty hearing, tinnitus, vertigo; No sinus or throat pain  NECK: No pain or stiffness  RESPIRATORY: No cough, wheezing, chills or hemoptysis; No shortness of breath  CARDIOVASCULAR: No chest pain, palpitations, dizziness, or leg swelling  GASTROINTESTINAL: No abdominal or epigastric pain. No nausea, vomiting, or hematemesis; No diarrhea or constipation. No melena or hematochezia.  GENITOURINARY: No dysuria, frequency, hematuria, or incontinence  NEUROLOGICAL: No headaches, memory loss, loss of strength, numbness, or tremors  SKIN: No itching, burning, rashes, or lesions   LYMPH NODES: No enlarged glands  ENDOCRINE: No heat or cold intolerance; No hair loss  MUSCULOSKELETAL: No joint pain or swelling; No muscle, back, or extremity pain  PSYCHIATRIC: No depression, anxiety, mood swings, or difficulty sleeping  HEME/LYMPH: No easy bruising, or bleeding gums  ALLERGY AND IMMUNOLOGIC: No hives or eczema  ICU Vital Signs Last 24 Hrs  T(C): 37.7 (12 Aug 2017 05:03), Max: 37.8 (12 Aug 2017 00:26)  T(F): 99.8 (12 Aug 2017 05:03), Max: 100 (12 Aug 2017 00:26)  HR: 80 (12 Aug 2017 05:03) (78 - 884)  BP: 155/87 (12 Aug 2017 05:03) (142/82 - 192/93)  BP(mean): 95 (11 Aug 2017 17:41) (95 - 120)  ABP: --  ABP(mean): --  RR: 18 (12 Aug 2017 05:03) (13 - 23)  SpO2: 96% (12 Aug 2017 05:03) (92% - 100%)        MEDICATIONS  (STANDING):  calcitriol   Capsule 0.5 MICROGram(s) Oral daily  tacrolimus 5 milliGRAM(s) Oral two times a day  cloNIDine 0.1 milliGRAM(s) Oral two times a day  metoprolol succinate ER 50 milliGRAM(s) Oral daily  furosemide    Tablet 20 milliGRAM(s) Oral daily  heparin  Injectable 5000 Unit(s) SubCutaneous every 12 hours  hydrALAZINE Injectable 7.5 milliGRAM(s) IV Push every 8 hours    MEDICATIONS  (PRN):  acetaminophen   Tablet. 650 milliGRAM(s) Oral every 6 hours PRN Mild Pain (1 - 3)  acetaminophen   Tablet. 650 milliGRAM(s) Oral every 6 hours PRN Mild Pain (1 - 3)        PHYSICAL EXAM:    GENERAL: NAD, well-groomed, well-developed  HEAD:  Atraumatic, Normocephalic  EYES: EOMI, PERRLA, conjunctiva and sclera clear  ENMT: Moist mucous membranes  NECK: Supple, No JVD  NERVOUS SYSTEM:  Alert & Oriented X3, Moving all 4 limbs  CHEST/LUNG: Clear to auscultation bilaterally; No rales, rhonchi, wheezing, or rubs, Right HD cath   HEART: Regular rate and rhythm; No murmurs, rubs, or gallops  ABDOMEN: Soft, Nontender, Nondistended; Bowel sounds present  EXTREMITIES:  2+ Peripheral Pulses, No clubbing, cyanosis, or edema    LABS:                        7.0    7.2   )-----------( 134      ( 11 Aug 2017 03:41 )             22.0     08-11    142  |  104  |  96<H>  ----------------------------<  95  3.3<L>   |  24  |  13.70<H>    Ca    7.4<L>      11 Aug 2017 03:41  Phos  4.4     08-11  Mg     1.9     08-11    TPro  5.5<L>  /  Alb  3.3<L>  /  TBili  x   /  DBili  x   /  AST  x   /  ALT  x   /  AlkPhos  x   08-10    PT/INR - ( 11 Aug 2017 03:57 )   PT: 13.3 sec;   INR: 1.22 ratio               CAPILLARY BLOOD GLUCOSE          RECENT CULTURES:      RADIOLOGY & ADDITIONAL TESTS:  Imaging Personally Reviewed:  [ ] YES      Consultant(s) Notes Reviewed:  [ ] YES     Care Discussed with [ ] Consultants [X ] Patient [ ] Family  [ ]    [x ]  Other; RN  HEALTH ISSUES - PROBLEM Dx:  ESRD (end stage renal disease): ESRD (end stage renal disease)        DVT/GI ppx  Discussed with pt @ bedside

## 2017-08-13 LAB
ANION GAP SERPL CALC-SCNC: 9 MMOL/L — SIGNIFICANT CHANGE UP (ref 5–17)
BUN SERPL-MCNC: 29 MG/DL — HIGH (ref 7–23)
CALCIUM SERPL-MCNC: 7.8 MG/DL — LOW (ref 8.5–10.1)
CHLORIDE SERPL-SCNC: 105 MMOL/L — SIGNIFICANT CHANGE UP (ref 96–108)
CO2 SERPL-SCNC: 29 MMOL/L — SIGNIFICANT CHANGE UP (ref 22–31)
CREAT SERPL-MCNC: 6.43 MG/DL — HIGH (ref 0.5–1.3)
GLUCOSE SERPL-MCNC: 90 MG/DL — SIGNIFICANT CHANGE UP (ref 70–99)
HCT VFR BLD CALC: 40.2 % — SIGNIFICANT CHANGE UP (ref 34.5–45)
HGB BLD-MCNC: 12.7 G/DL — SIGNIFICANT CHANGE UP (ref 11.5–15.5)
MCHC RBC-ENTMCNC: 28.4 PG — SIGNIFICANT CHANGE UP (ref 27–34)
MCHC RBC-ENTMCNC: 31.7 GM/DL — LOW (ref 32–36)
MCV RBC AUTO: 89.4 FL — SIGNIFICANT CHANGE UP (ref 80–100)
PLATELET # BLD AUTO: 77 K/UL — LOW (ref 150–400)
POTASSIUM SERPL-MCNC: 3.6 MMOL/L — SIGNIFICANT CHANGE UP (ref 3.5–5.3)
POTASSIUM SERPL-SCNC: 3.6 MMOL/L — SIGNIFICANT CHANGE UP (ref 3.5–5.3)
RBC # BLD: 4.49 M/UL — SIGNIFICANT CHANGE UP (ref 3.8–5.2)
RBC # FLD: 15 % — SIGNIFICANT CHANGE UP (ref 11–15)
SODIUM SERPL-SCNC: 143 MMOL/L — SIGNIFICANT CHANGE UP (ref 135–145)
WBC # BLD: 3.2 K/UL — LOW (ref 3.8–10.5)
WBC # FLD AUTO: 3.2 K/UL — LOW (ref 3.8–10.5)

## 2017-08-13 PROCEDURE — 99232 SBSQ HOSP IP/OBS MODERATE 35: CPT

## 2017-08-13 RX ORDER — HYDRALAZINE HCL 50 MG
10 TABLET ORAL EVERY 6 HOURS
Qty: 0 | Refills: 0 | Status: DISCONTINUED | OUTPATIENT
Start: 2017-08-13 | End: 2017-08-15

## 2017-08-13 RX ORDER — AMLODIPINE BESYLATE 2.5 MG/1
5 TABLET ORAL DAILY
Qty: 0 | Refills: 0 | Status: DISCONTINUED | OUTPATIENT
Start: 2017-08-13 | End: 2017-08-15

## 2017-08-13 RX ADMIN — Medication 50 MILLIGRAM(S): at 05:46

## 2017-08-13 RX ADMIN — Medication 650 MILLIGRAM(S): at 16:42

## 2017-08-13 RX ADMIN — Medication 0.2 MILLIGRAM(S): at 21:43

## 2017-08-13 RX ADMIN — Medication 650 MILLIGRAM(S): at 15:30

## 2017-08-13 RX ADMIN — CARVEDILOL PHOSPHATE 12.5 MILLIGRAM(S): 80 CAPSULE, EXTENDED RELEASE ORAL at 18:22

## 2017-08-13 RX ADMIN — CALCITRIOL 0.5 MICROGRAM(S): 0.5 CAPSULE ORAL at 11:47

## 2017-08-13 RX ADMIN — Medication 10 MILLIGRAM(S): at 16:09

## 2017-08-13 RX ADMIN — TACROLIMUS 5 MILLIGRAM(S): 5 CAPSULE ORAL at 18:23

## 2017-08-13 RX ADMIN — Medication 0.1 MILLIGRAM(S): at 05:46

## 2017-08-13 RX ADMIN — AMLODIPINE BESYLATE 5 MILLIGRAM(S): 2.5 TABLET ORAL at 09:33

## 2017-08-13 RX ADMIN — HEPARIN SODIUM 5000 UNIT(S): 5000 INJECTION INTRAVENOUS; SUBCUTANEOUS at 05:47

## 2017-08-13 RX ADMIN — TACROLIMUS 5 MILLIGRAM(S): 5 CAPSULE ORAL at 05:47

## 2017-08-13 RX ADMIN — Medication 50 MILLIGRAM(S): at 18:22

## 2017-08-13 RX ADMIN — Medication 0.1 MILLIGRAM(S): at 15:00

## 2017-08-13 RX ADMIN — CARVEDILOL PHOSPHATE 12.5 MILLIGRAM(S): 80 CAPSULE, EXTENDED RELEASE ORAL at 05:53

## 2017-08-13 NOTE — PROGRESS NOTE ADULT - SUBJECTIVE AND OBJECTIVE BOX
Patient seen in follow up for ESRD    MEDICATIONS  (STANDING):  calcitriol   Capsule 0.5 MICROGram(s) Oral daily  tacrolimus 5 milliGRAM(s) Oral two times a day  heparin  Injectable 5000 Unit(s) SubCutaneous every 12 hours  carvedilol 12.5 milliGRAM(s) Oral every 12 hours  hydrALAZINE 50 milliGRAM(s) Oral every 12 hours  cloNIDine 0.2 milliGRAM(s) Oral two times a day  amLODIPine   Tablet 5 milliGRAM(s) Oral daily  cloNIDine 0.1 milliGRAM(s) Oral once    MEDICATIONS  (PRN):  acetaminophen   Tablet. 650 milliGRAM(s) Oral every 6 hours PRN Mild Pain (1 - 3)  acetaminophen   Tablet. 650 milliGRAM(s) Oral every 6 hours PRN Mild Pain (1 - 3)    PHYSICAL EXAM:      T(C): 37.4 (08-13-17 @ 05:21), Max: 37.7 (08-12-17 @ 23:23)  HR: 75 (08-13-17 @ 05:21) (74 - 84)  BP: 190/101 (08-13-17 @ 05:21) (142/109 - 190/101)  RR: 16 (08-13-17 @ 05:21) (16 - 18)  SpO2: 98% (08-13-17 @ 05:21) (98% - 100%)  Wt(kg): --  Respiratory: clear anteriorly, decreased BS at bases  Cardiovascular: S1 S2  Gastrointestinal: soft NT ND +BS reducible hernias  Extremities:   tr edema                                    12.7   3.2   )-----------( 77       ( 13 Aug 2017 06:48 )             40.2     08-13    143  |  105  |  29<H>  ----------------------------<  90  3.6   |  29  |  6.43<H>    Ca    7.8<L>      13 Aug 2017 06:48  Phos  3.4     08-12  Mg     2.0     08-12    TPro  6.4  /  Alb  3.2<L>  /  TBili  0.4  /  DBili  x   /  AST  24  /  ALT  15  /  AlkPhos  112  08-12      LIVER FUNCTIONS - ( 12 Aug 2017 11:45 )  Alb: 3.2 g/dL / Pro: 6.4 gm/dL / ALK PHOS: 112 U/L / ALT: 15 U/L / AST: 24 U/L / GGT: x             Assessment and Plan:  HD for tomorrow; for perm cath placement;  D/C planning.  Add Amlodipine 5 daily.

## 2017-08-13 NOTE — PHYSICAL THERAPY INITIAL EVALUATION ADULT - MODIFIED CLINICAL TEST OF SENSORY INTEGRATION IN BALANCE TEST
Barthel Index: Feeding Score _10__, Bathing Score 5___, Grooming Score _5__, Dressing Score ___10, Bowels Score _10__, Bladder Score ___10, Toilet Score _10__, Transfers Score _15__, Mobility Score __15_, Stairs Score ___10,     Total [id number] ___

## 2017-08-13 NOTE — PROGRESS NOTE ADULT - SUBJECTIVE AND OBJECTIVE BOX
CHIEF COMPLAINT/INTERVAL HISTORY:    Patient is a 46y old  Female who presents with a chief complaint of c/o new onset seizure (10 Aug 2017 05:09)      HPI:  47 Y/O female w/ PMHx of HTN, CRF s/p kidney transplant 2005 presents s/p seizure x3 at doctors office. Pt reports no PMHx of seizures, and states that she doesn't recall how she felt prior to or recall having the seizures. She states that she had increased weakness and dizziness throughout the week. According to the pt, she was visiting her doctors office because they were planning to put her on HD. ICU called to assist with new HD. (10 Aug 2017 03:52)    Overnight issues  Patient denies any chest pain, SOB  No headache or dizziness  BP high          SUBJECTIVE & OBJECTIVE: Pt seen and examined at bedside.   ROS:  CONSTITUTIONAL: No fever, weight loss, or fatigue  EYES: No eye pain, visual disturbances, or discharge  ENMT:  No difficulty hearing, tinnitus, vertigo; No sinus or throat pain  NECK: No pain or stiffness  RESPIRATORY: No cough, wheezing, chills or hemoptysis; No shortness of breath  CARDIOVASCULAR: No chest pain, palpitations, dizziness, or leg swelling  GASTROINTESTINAL: No abdominal or epigastric pain. No nausea, vomiting, or hematemesis; No diarrhea or constipation. No melena or hematochezia.  GENITOURINARY: No dysuria, frequency, hematuria, or incontinence  NEUROLOGICAL: No headaches, memory loss, loss of strength, numbness, or tremors  SKIN: No itching, burning, rashes, or lesions   LYMPH NODES: No enlarged glands  ENDOCRINE: No heat or cold intolerance; No hair loss  MUSCULOSKELETAL: No joint pain or swelling; No muscle, back, or extremity pain  PSYCHIATRIC: No depression, anxiety, mood swings, or difficulty sleeping  HEME/LYMPH: No easy bruising, or bleeding gums  ALLERGY AND IMMUNOLOGIC: No hives or eczema  ICU Vital Signs Last 24 Hrs  T(C): 37.4 (13 Aug 2017 05:21), Max: 37.7 (12 Aug 2017 23:23)  T(F): 99.4 (13 Aug 2017 05:21), Max: 99.8 (12 Aug 2017 23:23)  HR: 75 (13 Aug 2017 05:21) (74 - 84)  BP: 190/101 (13 Aug 2017 05:21) (142/109 - 190/101)  BP(mean): --  ABP: --  ABP(mean): --  RR: 16 (13 Aug 2017 05:21) (16 - 18)  SpO2: 98% (13 Aug 2017 05:21) (98% - 100%)        MEDICATIONS  (STANDING):  calcitriol   Capsule 0.5 MICROGram(s) Oral daily  tacrolimus 5 milliGRAM(s) Oral two times a day  heparin  Injectable 5000 Unit(s) SubCutaneous every 12 hours  carvedilol 12.5 milliGRAM(s) Oral every 12 hours  hydrALAZINE 50 milliGRAM(s) Oral every 12 hours  cloNIDine 0.1 milliGRAM(s) Oral every 12 hours    MEDICATIONS  (PRN):  acetaminophen   Tablet. 650 milliGRAM(s) Oral every 6 hours PRN Mild Pain (1 - 3)  acetaminophen   Tablet. 650 milliGRAM(s) Oral every 6 hours PRN Mild Pain (1 - 3)        PHYSICAL EXAM:    GENERAL: NAD, well-groomed, well-developed  HEAD:  Atraumatic, Normocephalic  EYES: EOMI, PERRLA, conjunctiva and sclera clear  ENMT: Moist mucous membranes  NECK: Supple, No JVD  NERVOUS SYSTEM:  Alert & Oriented X3, Moving all 4 limbs  CHEST/LUNG: Clear to auscultation bilaterally; No rales, rhonchi, wheezing, or rubs, right HD cath good  HEART: Regular rate and rhythm; No murmurs, rubs, or gallops  ABDOMEN: Soft, Nontender, Nondistended; Bowel sounds present  EXTREMITIES:  2+ Peripheral Pulses, No clubbing, cyanosis, or edema    LABS:                        12.7   3.2   )-----------( 77       ( 13 Aug 2017 06:48 )             40.2     08-13    143  |  105  |  29<H>  ----------------------------<  90  3.6   |  29  |  6.43<H>    Ca    7.8<L>      13 Aug 2017 06:48  Phos  3.4     08-12  Mg     2.0     08-12    TPro  6.4  /  Alb  3.2<L>  /  TBili  0.4  /  DBili  x   /  AST  24  /  ALT  15  /  AlkPhos  112  08-12          CAPILLARY BLOOD GLUCOSE          RECENT CULTURES:      RADIOLOGY & ADDITIONAL TESTS:  Imaging Personally Reviewed:  [ ] YES      Consultant(s) Notes Reviewed:  [ ] YES     Care Discussed with [ ] Consultants [X ] Patient [ ] Family  [ ]    [x ]  Other; RN  HEALTH ISSUES - PROBLEM Dx:  Essential hypertension: Essential hypertension  History of renal transplant: History of renal transplant  New onset seizure: New onset seizure  ESRD (end stage renal disease): ESRD (end stage renal disease)        DVT/GI ppx  Discussed with pt @ bedside

## 2017-08-14 LAB
% GAMMA, URINE: 8.6 % — SIGNIFICANT CHANGE UP
ALBUMIN 24H MFR UR ELPH: 65.6 % — SIGNIFICANT CHANGE UP
ALPHA1 GLOB 24H MFR UR ELPH: 6.1 % — SIGNIFICANT CHANGE UP
ALPHA2 GLOB 24H MFR UR ELPH: 7 % — SIGNIFICANT CHANGE UP
ANION GAP SERPL CALC-SCNC: 12 MMOL/L — SIGNIFICANT CHANGE UP (ref 5–17)
B-GLOBULIN 24H MFR UR ELPH: 12.7 % — SIGNIFICANT CHANGE UP
BUN SERPL-MCNC: 41 MG/DL — HIGH (ref 7–23)
CALCIUM SERPL-MCNC: 8 MG/DL — LOW (ref 8.5–10.1)
CHLORIDE SERPL-SCNC: 104 MMOL/L — SIGNIFICANT CHANGE UP (ref 96–108)
CO2 SERPL-SCNC: 25 MMOL/L — SIGNIFICANT CHANGE UP (ref 22–31)
COLLECT DURATION TIME UR: 24 HR — SIGNIFICANT CHANGE UP
CREAT SERPL-MCNC: 8.89 MG/DL — HIGH (ref 0.5–1.3)
GLUCOSE SERPL-MCNC: 101 MG/DL — HIGH (ref 70–99)
HCT VFR BLD CALC: 25.4 % — LOW (ref 34.5–45)
HGB BLD-MCNC: 7.7 G/DL — LOW (ref 11.5–15.5)
INTERPRETATION 24H UR IFE-IMP: SIGNIFICANT CHANGE UP
INTERPRETATION 24H UR IFE-IMP: SIGNIFICANT CHANGE UP
M PROTEIN 24H UR ELPH-MRATE: 0 MG/24HR — SIGNIFICANT CHANGE UP (ref 0–0)
M PROTEIN 24H UR ELPH-MRATE: 0 MG/DL — SIGNIFICANT CHANGE UP
MCHC RBC-ENTMCNC: 27.6 PG — SIGNIFICANT CHANGE UP (ref 27–34)
MCHC RBC-ENTMCNC: 30.5 GM/DL — LOW (ref 32–36)
MCV RBC AUTO: 90.6 FL — SIGNIFICANT CHANGE UP (ref 80–100)
PLATELET # BLD AUTO: 121 K/UL — LOW (ref 150–400)
POTASSIUM SERPL-MCNC: 3.7 MMOL/L — SIGNIFICANT CHANGE UP (ref 3.5–5.3)
POTASSIUM SERPL-SCNC: 3.7 MMOL/L — SIGNIFICANT CHANGE UP (ref 3.5–5.3)
PROT ?TM UR-MCNC: 322 MG/DL — HIGH (ref 0–12)
PROT PATTERN 24H UR ELPH-IMP: SIGNIFICANT CHANGE UP
PROTEIN QUANT CALC, URINE: 2093 MG/24 H — HIGH (ref 50–100)
RBC # BLD: 2.8 M/UL — LOW (ref 3.8–5.2)
RBC # FLD: 15.2 % — HIGH (ref 11–15)
SODIUM SERPL-SCNC: 141 MMOL/L — SIGNIFICANT CHANGE UP (ref 135–145)
TOTAL VOLUME - 24 HOUR: 650 ML — SIGNIFICANT CHANGE UP
URINE CREATININE CALCULATION: 0.5 G/24 H — LOW (ref 0.8–1.8)
WBC # BLD: 5.8 K/UL — SIGNIFICANT CHANGE UP (ref 3.8–10.5)
WBC # FLD AUTO: 5.8 K/UL — SIGNIFICANT CHANGE UP (ref 3.8–10.5)

## 2017-08-14 PROCEDURE — 76937 US GUIDE VASCULAR ACCESS: CPT | Mod: 26

## 2017-08-14 PROCEDURE — 99233 SBSQ HOSP IP/OBS HIGH 50: CPT

## 2017-08-14 PROCEDURE — 36558 INSERT TUNNELED CV CATH: CPT

## 2017-08-14 PROCEDURE — 71010: CPT | Mod: 26

## 2017-08-14 PROCEDURE — 77001 FLUOROGUIDE FOR VEIN DEVICE: CPT | Mod: 26

## 2017-08-14 RX ORDER — OXYCODONE AND ACETAMINOPHEN 5; 325 MG/1; MG/1
1 TABLET ORAL ONCE
Qty: 0 | Refills: 0 | Status: DISCONTINUED | OUTPATIENT
Start: 2017-08-14 | End: 2017-08-14

## 2017-08-14 RX ORDER — CARVEDILOL PHOSPHATE 80 MG/1
25 CAPSULE, EXTENDED RELEASE ORAL EVERY 12 HOURS
Qty: 0 | Refills: 0 | Status: DISCONTINUED | OUTPATIENT
Start: 2017-08-14 | End: 2017-08-15

## 2017-08-14 RX ORDER — HYDRALAZINE HCL 50 MG
50 TABLET ORAL EVERY 8 HOURS
Qty: 0 | Refills: 0 | Status: DISCONTINUED | OUTPATIENT
Start: 2017-08-14 | End: 2017-08-15

## 2017-08-14 RX ADMIN — Medication 0.2 MILLIGRAM(S): at 05:25

## 2017-08-14 RX ADMIN — CALCITRIOL 0.5 MICROGRAM(S): 0.5 CAPSULE ORAL at 12:06

## 2017-08-14 RX ADMIN — Medication 650 MILLIGRAM(S): at 13:25

## 2017-08-14 RX ADMIN — AMLODIPINE BESYLATE 5 MILLIGRAM(S): 2.5 TABLET ORAL at 05:24

## 2017-08-14 RX ADMIN — Medication 10 MILLIGRAM(S): at 12:13

## 2017-08-14 RX ADMIN — Medication 650 MILLIGRAM(S): at 12:29

## 2017-08-14 RX ADMIN — OXYCODONE AND ACETAMINOPHEN 1 TABLET(S): 5; 325 TABLET ORAL at 17:43

## 2017-08-14 RX ADMIN — Medication 50 MILLIGRAM(S): at 15:25

## 2017-08-14 RX ADMIN — TACROLIMUS 5 MILLIGRAM(S): 5 CAPSULE ORAL at 21:01

## 2017-08-14 RX ADMIN — CARVEDILOL PHOSPHATE 12.5 MILLIGRAM(S): 80 CAPSULE, EXTENDED RELEASE ORAL at 05:25

## 2017-08-14 RX ADMIN — TACROLIMUS 5 MILLIGRAM(S): 5 CAPSULE ORAL at 05:24

## 2017-08-14 RX ADMIN — OXYCODONE AND ACETAMINOPHEN 1 TABLET(S): 5; 325 TABLET ORAL at 18:15

## 2017-08-14 RX ADMIN — Medication 50 MILLIGRAM(S): at 05:25

## 2017-08-14 RX ADMIN — Medication 0.2 MILLIGRAM(S): at 18:36

## 2017-08-14 RX ADMIN — CARVEDILOL PHOSPHATE 25 MILLIGRAM(S): 80 CAPSULE, EXTENDED RELEASE ORAL at 21:00

## 2017-08-14 RX ADMIN — Medication 50 MILLIGRAM(S): at 21:00

## 2017-08-14 NOTE — PHARMACY COMMUNICATION NOTE - COMMENTS
Md acknowledged alert per Md however spoke to RN about the reactions to dilaudid and will call MD to just be sure.

## 2017-08-14 NOTE — PROGRESS NOTE ADULT - SUBJECTIVE AND OBJECTIVE BOX
CHIEF COMPLAINT/INTERVAL HISTORY:    Patient is a 46y old  Female who presents with a chief complaint of c/o new onset seizure (10 Aug 2017 05:09)      HPI:  45 Y/O female w/ PMHx of HTN, CRF s/p kidney transplant 2005 presents s/p seizure x3 at doctors office. Pt reports no PMHx of seizures, and states that she doesn't recall how she felt prior to or recall having the seizures. She states that she had increased weakness and dizziness throughout the week. According to the pt, she was visiting her doctors office because they were planning to put her on HD. ICU called to assist with new HD. (10 Aug 2017 03:52)    Overnight issues              SUBJECTIVE & OBJECTIVE: Pt seen and examined at bedside.   ROS:  CONSTITUTIONAL: No fever, weight loss, or fatigue  NECK: No pain or stiffness  RESPIRATORY: No cough, wheezing, chills or hemoptysis; No shortness of breath  CARDIOVASCULAR: No chest pain, palpitations, dizziness, or leg swelling  GASTROINTESTINAL: No abdominal or epigastric pain. No nausea, vomiting, or hematemesis; No diarrhea or constipation. No melena or hematochezia.  GENITOURINARY: No dysuria, frequency, hematuria, or incontinence  NEUROLOGICAL: No headaches, memory loss, loss of strength, numbness, or tremors  SKIN: No itching, burning, rashes, or lesions   ICU Vital Signs Last 24 Hrs  T(C): 36.2 (14 Aug 2017 14:00), Max: 37.1 (14 Aug 2017 05:21)  T(F): 97.2 (14 Aug 2017 14:00), Max: 98.8 (14 Aug 2017 05:21)  HR: 79 (14 Aug 2017 15:13) (64 - 79)  BP: 182/100 (14 Aug 2017 15:13) (148/97 - 187/110)  BP(mean): --  ABP: --  ABP(mean): --  RR: 18 (14 Aug 2017 15:13) (16 - 18)  SpO2: 100% (14 Aug 2017 15:13) (98% - 100%)        MEDICATIONS  (STANDING):  calcitriol   Capsule 0.5 MICROGram(s) Oral daily  tacrolimus 5 milliGRAM(s) Oral two times a day  heparin  Injectable 5000 Unit(s) SubCutaneous every 12 hours  cloNIDine 0.2 milliGRAM(s) Oral two times a day  amLODIPine   Tablet 5 milliGRAM(s) Oral daily  hydrALAZINE 50 milliGRAM(s) Oral every 8 hours  carvedilol 25 milliGRAM(s) Oral every 12 hours    MEDICATIONS  (PRN):  acetaminophen   Tablet. 650 milliGRAM(s) Oral every 6 hours PRN Mild Pain (1 - 3)  acetaminophen   Tablet. 650 milliGRAM(s) Oral every 6 hours PRN Mild Pain (1 - 3)  hydrALAZINE Injectable 10 milliGRAM(s) IV Push every 6 hours PRN systolic blood pressure greater than 165  oxyCODONE    5 mG/acetaminophen 325 mG 1 Tablet(s) Oral once PRN Severe Pain (7 - 10)        PHYSICAL EXAM:    GENERAL: NAD, well-groomed, well-developed  HEAD:  Atraumatic, Normocephalic  EYES: EOMI, PERRLA, conjunctiva and sclera clear  ENMT: Moist mucous membranes  NECK: Supple, No JVD  NERVOUS SYSTEM:  Alert & Oriented X3, Motor Strength 5/5 B/L upper and lower extremities; DTRs 2+ intact and symmetric  CHEST/LUNG: Clear to auscultation bilaterally; No rales, rhonchi, wheezing, or rubs  HEART: Regular rate and rhythm; No murmurs, rubs, or gallops  ABDOMEN: Soft, Nontender, Nondistended; Bowel sounds present  EXTREMITIES:  2+ Peripheral Pulses, No clubbing, cyanosis, or edema    LABS:                        7.7    5.8   )-----------( 121      ( 14 Aug 2017 12:01 )             25.4     08-14    141  |  104  |  41<H>  ----------------------------<  101<H>  3.7   |  25  |  8.89<H>    Ca    8.0<L>      14 Aug 2017 12:01            CAPILLARY BLOOD GLUCOSE          RECENT CULTURES:      RADIOLOGY & ADDITIONAL TESTS:  Imaging Personally Reviewed:  [ ] YES      Consultant(s) Notes Reviewed:  [ ] YES     Care Discussed with [ ] Consultants [X ] Patient [ ] Family  [x ]    [x ]  Other; RN  HEALTH ISSUES - PROBLEM Dx:  Essential hypertension: Essential hypertension  History of renal transplant: History of renal transplant  New onset seizure: New onset seizure  ESRD (end stage renal disease): ESRD (end stage renal disease)        DVT/GI ppx  Discussed with pt @ bedside CHIEF COMPLAINT/INTERVAL HISTORY:    Patient is a 46y old  Female who presents with a chief complaint of c/o new onset seizure (10 Aug 2017 05:09)      HPI:  45 Y/O female w/ PMHx of HTN, CRF s/p kidney transplant 2005 presents s/p seizure x3 at doctors office. Pt reports no PMHx of seizures, and states that she doesn't recall how she felt prior to or recall having the seizures. She states that she had increased weakness and dizziness throughout the week. According to the pt, she was visiting her doctors office because they were planning to put her on HD. ICU called to assist with new HD. (10 Aug 2017 03:52)    Overnight issues  had permacath placed today and then had hemodialysis afterwards   SUBJECTIVE & OBJECTIVE: Pt seen and examined at bedside.   ROS:  CONSTITUTIONAL: No fever, weight loss, or fatigue  NECK: No pain or stiffness  RESPIRATORY: No cough, wheezing, chills or hemoptysis; No shortness of breath  CARDIOVASCULAR: No chest pain, palpitations, dizziness, or leg swelling  GASTROINTESTINAL: No abdominal or epigastric pain. No nausea, vomiting, or hematemesis; No diarrhea or constipation. No melena or hematochezia.  GENITOURINARY: No dysuria, frequency, hematuria, or incontinence  NEUROLOGICAL: No headaches, memory loss, loss of strength, numbness, or tremors  SKIN: No itching, burning, rashes, or lesions   ICU Vital Signs Last 24 Hrs  T(C): 36.2 (14 Aug 2017 14:00), Max: 37.1 (14 Aug 2017 05:21)  T(F): 97.2 (14 Aug 2017 14:00), Max: 98.8 (14 Aug 2017 05:21)  HR: 79 (14 Aug 2017 15:13) (64 - 79)  BP: 182/100 (14 Aug 2017 15:13) (148/97 - 187/110)  BP(mean): --  ABP: --  ABP(mean): --  RR: 18 (14 Aug 2017 15:13) (16 - 18)  SpO2: 100% (14 Aug 2017 15:13) (98% - 100%)        MEDICATIONS  (STANDING):  calcitriol   Capsule 0.5 MICROGram(s) Oral daily  tacrolimus 5 milliGRAM(s) Oral two times a day  heparin  Injectable 5000 Unit(s) SubCutaneous every 12 hours  cloNIDine 0.2 milliGRAM(s) Oral two times a day  amLODIPine   Tablet 5 milliGRAM(s) Oral daily  hydrALAZINE 50 milliGRAM(s) Oral every 8 hours  carvedilol 25 milliGRAM(s) Oral every 12 hours    MEDICATIONS  (PRN):  acetaminophen   Tablet. 650 milliGRAM(s) Oral every 6 hours PRN Mild Pain (1 - 3)  acetaminophen   Tablet. 650 milliGRAM(s) Oral every 6 hours PRN Mild Pain (1 - 3)  hydrALAZINE Injectable 10 milliGRAM(s) IV Push every 6 hours PRN systolic blood pressure greater than 165  oxyCODONE    5 mG/acetaminophen 325 mG 1 Tablet(s) Oral once PRN Severe Pain (7 - 10)        PHYSICAL EXAM:    GENERAL: NAD, well-groomed, well-developed  HEAD:  Atraumatic, Normocephalic  EYES: EOMI, PERRLA, conjunctiva and sclera clear  ENMT: Moist mucous membranes  NECK: Supple, No JVD  NERVOUS SYSTEM:  Alert & Oriented X3, Motor Strength 5/5 B/L upper and lower extremities; DTRs 2+ intact and symmetric  CHEST/LUNG: Clear to auscultation bilaterally; No rales, rhonchi, wheezing, or rubs  HEART: Regular rate and rhythm; No murmurs, rubs, or gallops  ABDOMEN: Soft, Nontender, Nondistended; Bowel sounds present  EXTREMITIES:  2+ Peripheral Pulses, No clubbing, cyanosis, or edema    LABS:                        7.7    5.8   )-----------( 121      ( 14 Aug 2017 12:01 )             25.4     08-14    141  |  104  |  41<H>  ----------------------------<  101<H>  3.7   |  25  |  8.89<H>    Ca    8.0<L>      14 Aug 2017 12:01            CAPILLARY BLOOD GLUCOSE          RECENT CULTURES:      RADIOLOGY & ADDITIONAL TESTS:  Imaging Personally Reviewed:  [ ] YES      Consultant(s) Notes Reviewed:  [ ] YES     Care Discussed with [ ] Consultants [X ] Patient [ ] Family  [x ]    [x ]  Other; RN  HEALTH ISSUES - PROBLEM Dx:  Essential hypertension: Essential hypertension  History of renal transplant: History of renal transplant  New onset seizure: New onset seizure  ESRD (end stage renal disease): ESRD (end stage renal disease)        DVT/GI ppx  Discussed with pt @ bedside

## 2017-08-14 NOTE — PROGRESS NOTE ADULT - SUBJECTIVE AND OBJECTIVE BOX
Patient seen in follow up for     MEDICATIONS  (STANDING):  calcitriol   Capsule 0.5 MICROGram(s) Oral daily  tacrolimus 5 milliGRAM(s) Oral two times a day  heparin  Injectable 5000 Unit(s) SubCutaneous every 12 hours  cloNIDine 0.2 milliGRAM(s) Oral two times a day  amLODIPine   Tablet 5 milliGRAM(s) Oral daily  hydrALAZINE 50 milliGRAM(s) Oral every 8 hours  carvedilol 25 milliGRAM(s) Oral every 12 hours    MEDICATIONS  (PRN):  acetaminophen   Tablet. 650 milliGRAM(s) Oral every 6 hours PRN Mild Pain (1 - 3)  acetaminophen   Tablet. 650 milliGRAM(s) Oral every 6 hours PRN Mild Pain (1 - 3)  hydrALAZINE Injectable 10 milliGRAM(s) IV Push every 6 hours PRN systolic blood pressure greater than 165  oxyCODONE    5 mG/acetaminophen 325 mG 1 Tablet(s) Oral once PRN Severe Pain (7 - 10)    PHYSICAL EXAM:      T(C): 36.1 (08-14-17 @ 13:05), Max: 37.1 (08-14-17 @ 05:21)  HR: 70 (08-14-17 @ 13:05) (64 - 82)  BP: 169/108 (08-14-17 @ 13:05) (148/97 - 216/126)  RR: 18 (08-14-17 @ 13:05) (16 - 18)  SpO2: 100% (08-14-17 @ 13:05) (98% - 100%)  Wt(kg): --  HEENT right neck and chest dressing;   Respiratory: clear anteriorly, decreased BS at bases  Cardiovascular: S1 S2  Gastrointestinal: soft NT ND +BS  Extremities:  1 edema                                    7.7    5.8   )-----------( 121      ( 14 Aug 2017 12:01 )             25.4     08-14    141  |  104  |  41<H>  ----------------------------<  101<H>  3.7   |  25  |  8.89<H>    Ca    8.0<L>      14 Aug 2017 12:01            Assessment and Plan:    LALITA, CKD 5; no w HD dependent; HTN urgency;  Post perm cath; pain at site,  Discussed with patient, she related Dilaudid is not an allergy but dose related side effect.  HD today UF as tolerated; BP medications adjusted.  Will follow.

## 2017-08-14 NOTE — PROGRESS NOTE ADULT - PROBLEM SELECTOR PLAN 2
No New seizures  MRI no acute changes  Likely PRES

## 2017-08-14 NOTE — PROGRESS NOTE ADULT - PROBLEM SELECTOR PLAN 3
Failed  S/p new start HD this admission  Immunosuppresion per renal

## 2017-08-14 NOTE — PROGRESS NOTE ADULT - PROBLEM SELECTOR PLAN 1
New start HD this admission  Renal following  Fluid status and electrolytes are acceptable  Had HD today tolerated well  HD schedule per renal  permacath placed
New start HD this admission  Renal following  Fluid status and electrolytes are acceptable  Had HD yesterday- tolerated well  HD schedule per renal
New start HD this admission  Renal follow up appreciated  Fluid status is acceptable  HD per renal

## 2017-08-14 NOTE — PROGRESS NOTE ADULT - SUBJECTIVE AND OBJECTIVE BOX
Patient s/p tunneled dialysis cathter placement via right IJ vein and removal of nontunneled dialysis catheter    Operators: Annie Avelar MD    Findings: Tip in SVC/right atrial junction    Complications: None.    Blood loss: < 10 mL      ASSESSMENT:  45 y/o female with history of ESRD s/p renal transplant and HTN presenting with LALITA and uremic or HTN encephalopathy.    PLAN:  s/p tunneled dialysis catheter placement  continue HD as per nephrology

## 2017-08-14 NOTE — PROGRESS NOTE ADULT - PROBLEM SELECTOR PLAN 4
BP high - will increase Clonidine to 0.2 mg twice daily for better BP control for now  Continue to watch BP
BP high - will increase Clonidine to 0.2 mg twice daily for better BP control for now  Continue to watch BP
BP if stays high may need titrate up BP meds

## 2017-08-15 ENCOUNTER — TRANSCRIPTION ENCOUNTER (OUTPATIENT)
Age: 47
End: 2017-08-15

## 2017-08-15 VITALS — WEIGHT: 159.84 LBS

## 2017-08-15 PROCEDURE — 99239 HOSP IP/OBS DSCHRG MGMT >30: CPT

## 2017-08-15 RX ORDER — OXYCODONE AND ACETAMINOPHEN 5; 325 MG/1; MG/1
1 TABLET ORAL ONCE
Qty: 0 | Refills: 0 | Status: DISCONTINUED | OUTPATIENT
Start: 2017-08-15 | End: 2017-08-15

## 2017-08-15 RX ORDER — CARVEDILOL PHOSPHATE 80 MG/1
1 CAPSULE, EXTENDED RELEASE ORAL
Qty: 60 | Refills: 0 | OUTPATIENT
Start: 2017-08-15 | End: 2017-09-14

## 2017-08-15 RX ORDER — CALCITRIOL 0.5 UG/1
1 CAPSULE ORAL
Qty: 30 | Refills: 0 | OUTPATIENT
Start: 2017-08-15 | End: 2017-09-14

## 2017-08-15 RX ORDER — AMLODIPINE BESYLATE 2.5 MG/1
1 TABLET ORAL
Qty: 30 | Refills: 0 | OUTPATIENT
Start: 2017-08-15 | End: 2017-09-14

## 2017-08-15 RX ORDER — TACROLIMUS 5 MG/1
1 CAPSULE ORAL
Qty: 0 | Refills: 0 | COMMUNITY
Start: 2017-08-15

## 2017-08-15 RX ORDER — ACETAMINOPHEN 500 MG
2 TABLET ORAL
Qty: 0 | Refills: 0 | COMMUNITY
Start: 2017-08-15

## 2017-08-15 RX ADMIN — AMLODIPINE BESYLATE 5 MILLIGRAM(S): 2.5 TABLET ORAL at 05:17

## 2017-08-15 RX ADMIN — TACROLIMUS 5 MILLIGRAM(S): 5 CAPSULE ORAL at 05:17

## 2017-08-15 RX ADMIN — Medication 650 MILLIGRAM(S): at 03:11

## 2017-08-15 RX ADMIN — Medication 50 MILLIGRAM(S): at 05:16

## 2017-08-15 RX ADMIN — CARVEDILOL PHOSPHATE 25 MILLIGRAM(S): 80 CAPSULE, EXTENDED RELEASE ORAL at 05:16

## 2017-08-15 RX ADMIN — Medication 650 MILLIGRAM(S): at 02:11

## 2017-08-15 RX ADMIN — Medication 0.2 MILLIGRAM(S): at 05:17

## 2017-08-15 NOTE — DISCHARGE NOTE ADULT - HOSPITAL COURSE
She has had a  donor renal transplant for 11 years.  ESRD is due to HTN.  She has no other significant medical history.  She states the kidney has been failing lately and she has been urged to resume dialysis by her nephrologist, Dr. Donald Cali, for past 2 months or so.  She was in his office today when she started to have shaking and EMS brought her to ER where she arrived awake and coherent and there was no further seizure like activity.  SBP was in 220/110 mm Hg.  She cannot say what her recent creatinine levels were except that it was "very high".  She was also severely hypocalcemic.  She is on calcitriol for this but no calcium supplementation.   She denies any history of acute rejection or any recent renal biopsies.   patient was dialyzed as per renal and when the permacath was placed she was discharge

## 2017-08-15 NOTE — DISCHARGE NOTE ADULT - MEDICATION SUMMARY - MEDICATIONS TO TAKE
I will START or STAY ON the medications listed below when I get home from the hospital:    acetaminophen 325 mg oral tablet  -- 2 tab(s) by mouth every 6 hours, As needed, Mild Pain (1 - 3)  -- Indication: For ESRD (end stage renal disease)    acetaminophen 325 mg oral tablet  -- 2 tab(s) by mouth every 6 hours, As needed, Mild Pain (1 - 3)  -- Indication: For Essential hypertension    clonidine  -- 0.3  by transdermal patch once a week  -- Indication: For Essential hypertension    carvedilol 25 mg oral tablet  -- 1 tab(s) by mouth every 12 hours  -- Indication: For Essential hypertension    amLODIPine 5 mg oral tablet  -- 1 tab(s) by mouth once a day  -- Indication: For Essential hypertension    tacrolimus 5 mg oral capsule  -- 1 cap(s) by mouth 2 times a day  -- Indication: For Essential hypertension    hydrALAZINE 100 mg oral tablet  --  by mouth 3 times a day  -- Indication: For Essential hypertension    calcitriol 0.5 mcg oral capsule  -- 1 cap(s) by mouth once a day  -- Indication: For Hypocalcemia

## 2017-08-15 NOTE — DISCHARGE NOTE ADULT - MEDICATION SUMMARY - MEDICATIONS TO STOP TAKING
I will STOP taking the medications listed below when I get home from the hospital:    Cardizem 360 mg/24 hours oral capsule, extended release  -- 1 cap(s) by mouth once a day    clonidine 0.3 mg oral tablet  --  by mouth once a day    metoprolol tartrate 50 mg oral tablet  -- 1 tab(s) by mouth 2 times a day    Augmentin 875 mg-125 mg oral tablet  -- 1 tab(s) by mouth every 12 hours for 3 days    azithromycin 500 mg oral tablet  -- 1 tab(s) by mouth once a day; for 3 days    Lasix 40 mg oral tablet  -- 1 tab(s) by mouth once a day

## 2017-08-15 NOTE — DISCHARGE NOTE ADULT - PATIENT PORTAL LINK FT
“You can access the [facility] Patient Portal, offered by [facility], by registering with the following website: [email or web address]”

## 2017-08-15 NOTE — DISCHARGE NOTE ADULT - CARE PLAN
Principal Discharge DX:	New onset seizure  Goal:	resolved  Instructions for follow-up, activity and diet:	Follow up with your nephrologist  Secondary Diagnosis:	Essential hypertension  Secondary Diagnosis:	ESRD (end stage renal disease)

## 2017-08-15 NOTE — PROGRESS NOTE ADULT - SUBJECTIVE AND OBJECTIVE BOX
Patient seen in follow up for     MEDICATIONS  (STANDING):  calcitriol   Capsule 0.5 MICROGram(s) Oral daily  tacrolimus 5 milliGRAM(s) Oral two times a day  cloNIDine 0.2 milliGRAM(s) Oral two times a day  amLODIPine   Tablet 5 milliGRAM(s) Oral daily  hydrALAZINE 50 milliGRAM(s) Oral every 8 hours  carvedilol 25 milliGRAM(s) Oral every 12 hours    MEDICATIONS  (PRN):  acetaminophen   Tablet. 650 milliGRAM(s) Oral every 6 hours PRN Mild Pain (1 - 3)  acetaminophen   Tablet. 650 milliGRAM(s) Oral every 6 hours PRN Mild Pain (1 - 3)  hydrALAZINE Injectable 10 milliGRAM(s) IV Push every 6 hours PRN systolic blood pressure greater than 165  oxyCODONE    5 mG/acetaminophen 325 mG 1 Tablet(s) Oral once PRN Severe Pain (7 - 10)    PHYSICAL EXAM:      T(C): 37.7 (08-15-17 @ 05:19), Max: 37.7 (08-15-17 @ 05:19)  HR: 77 (08-15-17 @ 05:19) (70 - 77)  BP: 164/95 (08-15-17 @ 05:19) (133/67 - 171/99)  RR: 17 (08-15-17 @ 05:19) (16 - 18)  SpO2: 97% (08-15-17 @ 05:19) (96% - 99%)  Wt(kg): --  Respiratory: clear anteriorly, decreased BS at bases  Cardiovascular: S1 S2  Gastrointestinal: soft NT ND +BS  Extremities:   edema                                    7.7    5.8   )-----------( 121      ( 14 Aug 2017 12:01 )             25.4     08-14    141  |  104  |  41<H>  ----------------------------<  101<H>  3.7   |  25  |  8.89<H>    Ca    8.0<L>      14 Aug 2017 12:01            Assessment and Plan:    ESRD; on HD; failed renal allograft.  Discharge today.  Will follow.

## 2017-08-17 DIAGNOSIS — Z98.51 TUBAL LIGATION STATUS: ICD-10-CM

## 2017-08-17 DIAGNOSIS — G40.909 EPILEPSY, UNSPECIFIED, NOT INTRACTABLE, WITHOUT STATUS EPILEPTICUS: ICD-10-CM

## 2017-08-17 DIAGNOSIS — I12.0 HYPERTENSIVE CHRONIC KIDNEY DISEASE WITH STAGE 5 CHRONIC KIDNEY DISEASE OR END STAGE RENAL DISEASE: ICD-10-CM

## 2017-08-17 DIAGNOSIS — E43 UNSPECIFIED SEVERE PROTEIN-CALORIE MALNUTRITION: ICD-10-CM

## 2017-08-17 DIAGNOSIS — E83.51 HYPOCALCEMIA: ICD-10-CM

## 2017-08-17 DIAGNOSIS — I67.4 HYPERTENSIVE ENCEPHALOPATHY: ICD-10-CM

## 2017-08-17 DIAGNOSIS — Z99.2 DEPENDENCE ON RENAL DIALYSIS: ICD-10-CM

## 2017-08-17 DIAGNOSIS — D64.9 ANEMIA, UNSPECIFIED: ICD-10-CM

## 2017-08-17 DIAGNOSIS — I16.1 HYPERTENSIVE EMERGENCY: ICD-10-CM

## 2017-08-17 DIAGNOSIS — Z94.0 KIDNEY TRANSPLANT STATUS: ICD-10-CM

## 2017-08-17 DIAGNOSIS — N18.6 END STAGE RENAL DISEASE: ICD-10-CM

## 2017-09-12 ENCOUNTER — OUTPATIENT (OUTPATIENT)
Dept: OUTPATIENT SERVICES | Facility: HOSPITAL | Age: 47
LOS: 1 days | Discharge: HOME | End: 2017-09-12

## 2017-09-12 VITALS
RESPIRATION RATE: 18 BRPM | TEMPERATURE: 98 F | HEART RATE: 65 BPM | SYSTOLIC BLOOD PRESSURE: 155 MMHG | HEIGHT: 67 IN | OXYGEN SATURATION: 99 % | DIASTOLIC BLOOD PRESSURE: 96 MMHG | WEIGHT: 161.16 LBS

## 2017-09-12 VITALS
RESPIRATION RATE: 18 BRPM | OXYGEN SATURATION: 99 % | DIASTOLIC BLOOD PRESSURE: 96 MMHG | SYSTOLIC BLOOD PRESSURE: 155 MMHG | TEMPERATURE: 98 F | HEIGHT: 67 IN | WEIGHT: 161.16 LBS | HEART RATE: 65 BPM

## 2017-09-12 DIAGNOSIS — K43.2 INCISIONAL HERNIA WITHOUT OBSTRUCTION OR GANGRENE: ICD-10-CM

## 2017-09-12 DIAGNOSIS — I10 ESSENTIAL (PRIMARY) HYPERTENSION: ICD-10-CM

## 2017-09-12 DIAGNOSIS — K46.9 UNSPECIFIED ABDOMINAL HERNIA WITHOUT OBSTRUCTION OR GANGRENE: ICD-10-CM

## 2017-09-12 DIAGNOSIS — N18.6 END STAGE RENAL DISEASE: ICD-10-CM

## 2017-09-12 DIAGNOSIS — Z01.818 ENCOUNTER FOR OTHER PREPROCEDURAL EXAMINATION: ICD-10-CM

## 2017-09-12 LAB
APPEARANCE UR: CLEAR — SIGNIFICANT CHANGE UP
BACTERIA # UR AUTO: ABNORMAL
BILIRUB UR-MCNC: NEGATIVE — SIGNIFICANT CHANGE UP
COLOR SPEC: YELLOW — SIGNIFICANT CHANGE UP
COMMENT - URINE: SIGNIFICANT CHANGE UP
DIFF PNL FLD: ABNORMAL
EPI CELLS # UR: SIGNIFICANT CHANGE UP
GLUCOSE UR QL: NEGATIVE MG/DL — SIGNIFICANT CHANGE UP
HCG UR QL: NEGATIVE — SIGNIFICANT CHANGE UP
KETONES UR-MCNC: NEGATIVE — SIGNIFICANT CHANGE UP
LEUKOCYTE ESTERASE UR-ACNC: ABNORMAL
NITRITE UR-MCNC: NEGATIVE — SIGNIFICANT CHANGE UP
PH UR: 8 — SIGNIFICANT CHANGE UP (ref 5–8)
PROT UR-MCNC: 100 MG/DL
RBC CASTS # UR COMP ASSIST: SIGNIFICANT CHANGE UP /HPF (ref 0–4)
SP GR SPEC: 1.01 — SIGNIFICANT CHANGE UP (ref 1.01–1.02)
UROBILINOGEN FLD QL: NEGATIVE MG/DL — SIGNIFICANT CHANGE UP
WBC UR QL: SIGNIFICANT CHANGE UP

## 2017-09-12 NOTE — H&P PST ADULT - NSANTHOSAYNRD_GEN_A_CORE
No. ELIANE screening performed.  STOP BANG Legend: 0-2 = LOW Risk; 3-4 = INTERMEDIATE Risk; 5-8 = HIGH Risk

## 2017-09-12 NOTE — H&P PST ADULT - HISTORY OF PRESENT ILLNESS
46 year old female, PMH, Renal transplant 2005, HTN, Hemodialysis (currently R chest tessio 8/2017) presents for presurgical evaluation. Scheduled for incisional hernia repair.

## 2017-09-18 ENCOUNTER — RESULT REVIEW (OUTPATIENT)
Age: 47
End: 2017-09-18

## 2017-09-18 ENCOUNTER — OUTPATIENT (OUTPATIENT)
Dept: OUTPATIENT SERVICES | Facility: HOSPITAL | Age: 47
LOS: 1 days | Discharge: HOME | End: 2017-09-18
Payer: COMMERCIAL

## 2017-09-18 VITALS
OXYGEN SATURATION: 96 % | HEART RATE: 56 BPM | RESPIRATION RATE: 16 BRPM | SYSTOLIC BLOOD PRESSURE: 162 MMHG | DIASTOLIC BLOOD PRESSURE: 96 MMHG

## 2017-09-18 VITALS
DIASTOLIC BLOOD PRESSURE: 96 MMHG | RESPIRATION RATE: 16 BRPM | HEIGHT: 67 IN | HEART RATE: 62 BPM | WEIGHT: 161.38 LBS | SYSTOLIC BLOOD PRESSURE: 183 MMHG | TEMPERATURE: 97 F | OXYGEN SATURATION: 99 %

## 2017-09-18 LAB
HCT VFR BLD CALC: 35.3 % — SIGNIFICANT CHANGE UP (ref 34.5–45)
HGB BLD-MCNC: 10.9 G/DL — LOW (ref 11.5–15.5)
MCHC RBC-ENTMCNC: 29.3 PG — SIGNIFICANT CHANGE UP (ref 27–34)
MCHC RBC-ENTMCNC: 30.8 GM/DL — LOW (ref 32–36)
MCV RBC AUTO: 95.1 FL — SIGNIFICANT CHANGE UP (ref 80–100)
PLATELET # BLD AUTO: 177 K/UL — SIGNIFICANT CHANGE UP (ref 150–400)
POTASSIUM SERPL-MCNC: 5.3 MMOL/L — SIGNIFICANT CHANGE UP (ref 3.5–5.3)
POTASSIUM SERPL-SCNC: 5.3 MMOL/L — SIGNIFICANT CHANGE UP (ref 3.5–5.3)
RBC # BLD: 3.71 M/UL — LOW (ref 3.8–5.2)
RBC # FLD: 17.3 % — HIGH (ref 11–15)
WBC # BLD: 6.6 K/UL — SIGNIFICANT CHANGE UP (ref 3.8–10.5)
WBC # FLD AUTO: 6.6 K/UL — SIGNIFICANT CHANGE UP (ref 3.8–10.5)

## 2017-09-18 PROCEDURE — 88302 TISSUE EXAM BY PATHOLOGIST: CPT | Mod: 26

## 2017-09-18 PROCEDURE — 88305 TISSUE EXAM BY PATHOLOGIST: CPT | Mod: 26

## 2017-09-18 RX ORDER — ACETAMINOPHEN WITH CODEINE 300MG-30MG
1 TABLET ORAL
Qty: 30 | Refills: 0 | OUTPATIENT
Start: 2017-09-18

## 2017-09-18 RX ORDER — OXYCODONE HYDROCHLORIDE 5 MG/1
5 TABLET ORAL ONCE
Qty: 0 | Refills: 0 | Status: DISCONTINUED | OUTPATIENT
Start: 2017-09-18 | End: 2017-09-18

## 2017-09-18 RX ORDER — FENTANYL CITRATE 50 UG/ML
50 INJECTION INTRAVENOUS
Qty: 0 | Refills: 0 | Status: DISCONTINUED | OUTPATIENT
Start: 2017-09-18 | End: 2017-09-18

## 2017-09-18 RX ORDER — SODIUM CHLORIDE 9 MG/ML
1000 INJECTION INTRAMUSCULAR; INTRAVENOUS; SUBCUTANEOUS
Qty: 0 | Refills: 0 | Status: DISCONTINUED | OUTPATIENT
Start: 2017-09-18 | End: 2017-09-18

## 2017-09-18 RX ORDER — ONDANSETRON 8 MG/1
4 TABLET, FILM COATED ORAL ONCE
Qty: 0 | Refills: 0 | Status: DISCONTINUED | OUTPATIENT
Start: 2017-09-18 | End: 2017-09-18

## 2017-09-18 RX ORDER — SODIUM CHLORIDE 9 MG/ML
3 INJECTION INTRAMUSCULAR; INTRAVENOUS; SUBCUTANEOUS EVERY 8 HOURS
Qty: 0 | Refills: 0 | Status: DISCONTINUED | OUTPATIENT
Start: 2017-09-18 | End: 2017-09-18

## 2017-09-18 RX ADMIN — FENTANYL CITRATE 50 MICROGRAM(S): 50 INJECTION INTRAVENOUS at 14:15

## 2017-09-18 RX ADMIN — SODIUM CHLORIDE 30 MILLILITER(S): 9 INJECTION INTRAMUSCULAR; INTRAVENOUS; SUBCUTANEOUS at 14:05

## 2017-09-18 RX ADMIN — FENTANYL CITRATE 50 MICROGRAM(S): 50 INJECTION INTRAVENOUS at 14:50

## 2017-09-18 RX ADMIN — FENTANYL CITRATE 50 MICROGRAM(S): 50 INJECTION INTRAVENOUS at 14:51

## 2017-09-18 RX ADMIN — FENTANYL CITRATE 50 MICROGRAM(S): 50 INJECTION INTRAVENOUS at 15:05

## 2017-09-18 NOTE — BRIEF OPERATIVE NOTE - PROCEDURE
<<-----Click on this checkbox to enter Procedure Excision of scar of abdomen  09/18/2017    Active  [facility]  Incisional hernia repair with mesh  09/18/2017    Active  RAPHAEL

## 2017-09-18 NOTE — ASU DISCHARGE PLAN (ADULT/PEDIATRIC). - MEDICATION SUMMARY - MEDICATIONS TO TAKE
I will START or STAY ON the medications listed below when I get home from the hospital:    acetaminophen 325 mg oral tablet  -- 2 tab(s) by mouth every 6 hours, As needed, Mild Pain (1 - 3)  -- Indication: For as tolerated    acetaminophen 325 mg oral tablet  -- 2 tab(s) by mouth every 6 hours, As needed, Mild Pain (1 - 3)  -- Indication: For as tolerated    Tylenol with Codeine #4 oral tablet  -- 1 tab(s) by mouth every 6 hours, As Needed -for moderate pain MDD:4   -- Caution federal law prohibits the transfer of this drug to any person other  than the person for whom it was prescribed.  May cause drowsiness.  Alcohol may intensify this effect.  Use care when operating dangerous machinery.  This product contains acetaminophen.  Do not use  with any other product containing acetaminophen to prevent possible liver damage.  Using more of this medication than prescribed may cause serious breathing problems.    -- Indication: For as tolerated    clonidine  -- 0.3  by transdermal patch once a week  -- Indication: For as tolerated    carvedilol 25 mg oral tablet  -- 1 tab(s) by mouth every 12 hours  -- Indication: For as tolerated

## 2017-09-19 LAB — SURGICAL PATHOLOGY FINAL REPORT - CH: SIGNIFICANT CHANGE UP

## 2017-09-24 ENCOUNTER — TRANSCRIPTION ENCOUNTER (OUTPATIENT)
Age: 47
End: 2017-09-24

## 2017-09-26 DIAGNOSIS — Z94.0 KIDNEY TRANSPLANT STATUS: ICD-10-CM

## 2017-09-26 DIAGNOSIS — K43.0 INCISIONAL HERNIA WITH OBSTRUCTION, WITHOUT GANGRENE: ICD-10-CM

## 2017-09-26 DIAGNOSIS — N18.6 END STAGE RENAL DISEASE: ICD-10-CM

## 2017-09-26 DIAGNOSIS — I12.0 HYPERTENSIVE CHRONIC KIDNEY DISEASE WITH STAGE 5 CHRONIC KIDNEY DISEASE OR END STAGE RENAL DISEASE: ICD-10-CM

## 2017-10-07 ENCOUNTER — OUTPATIENT (OUTPATIENT)
Dept: OUTPATIENT SERVICES | Facility: HOSPITAL | Age: 47
LOS: 1 days | Discharge: HOME | End: 2017-10-07
Payer: COMMERCIAL

## 2017-10-07 ENCOUNTER — APPOINTMENT (OUTPATIENT)
Dept: ULTRASOUND IMAGING | Facility: HOSPITAL | Age: 47
End: 2017-10-07

## 2017-10-07 DIAGNOSIS — S37.021S: ICD-10-CM

## 2017-10-07 PROCEDURE — 93975 VASCULAR STUDY: CPT | Mod: 26

## 2018-01-11 ENCOUNTER — OUTPATIENT (OUTPATIENT)
Dept: OUTPATIENT SERVICES | Facility: HOSPITAL | Age: 48
LOS: 1 days | Discharge: HOME | End: 2018-01-11
Payer: COMMERCIAL

## 2018-01-11 VITALS
TEMPERATURE: 98 F | HEART RATE: 69 BPM | RESPIRATION RATE: 17 BRPM | HEIGHT: 67 IN | SYSTOLIC BLOOD PRESSURE: 151 MMHG | OXYGEN SATURATION: 97 % | DIASTOLIC BLOOD PRESSURE: 84 MMHG | WEIGHT: 158.51 LBS

## 2018-01-11 DIAGNOSIS — Z01.818 ENCOUNTER FOR OTHER PREPROCEDURAL EXAMINATION: ICD-10-CM

## 2018-01-11 DIAGNOSIS — Z94.0 KIDNEY TRANSPLANT STATUS: ICD-10-CM

## 2018-01-11 DIAGNOSIS — N93.9 ABNORMAL UTERINE AND VAGINAL BLEEDING, UNSPECIFIED: ICD-10-CM

## 2018-01-11 DIAGNOSIS — N92.0 EXCESSIVE AND FREQUENT MENSTRUATION WITH REGULAR CYCLE: ICD-10-CM

## 2018-01-11 DIAGNOSIS — Z99.2 DEPENDENCE ON RENAL DIALYSIS: Chronic | ICD-10-CM

## 2018-01-11 DIAGNOSIS — Z98.890 OTHER SPECIFIED POSTPROCEDURAL STATES: Chronic | ICD-10-CM

## 2018-01-11 DIAGNOSIS — N18.6 END STAGE RENAL DISEASE: ICD-10-CM

## 2018-01-11 DIAGNOSIS — I10 ESSENTIAL (PRIMARY) HYPERTENSION: ICD-10-CM

## 2018-01-11 DIAGNOSIS — R87.810 CERVICAL HIGH RISK HUMAN PAPILLOMAVIRUS (HPV) DNA TEST POSITIVE: ICD-10-CM

## 2018-01-11 LAB
ALBUMIN SERPL ELPH-MCNC: 3.5 G/DL — SIGNIFICANT CHANGE UP (ref 3.3–5)
ALP SERPL-CCNC: 75 U/L — SIGNIFICANT CHANGE UP (ref 40–120)
ALT FLD-CCNC: 17 U/L — SIGNIFICANT CHANGE UP (ref 12–78)
ANION GAP SERPL CALC-SCNC: 10 MMOL/L — SIGNIFICANT CHANGE UP (ref 5–17)
ANISOCYTOSIS BLD QL: SLIGHT — SIGNIFICANT CHANGE UP
APPEARANCE UR: ABNORMAL
APTT BLD: 37.8 SEC — HIGH (ref 27.5–37.4)
AST SERPL-CCNC: 14 U/L — LOW (ref 15–37)
BACTERIA # UR AUTO: ABNORMAL
BASOPHILS # BLD AUTO: 0.1 K/UL — SIGNIFICANT CHANGE UP (ref 0–0.2)
BILIRUB SERPL-MCNC: 0.3 MG/DL — SIGNIFICANT CHANGE UP (ref 0.2–1.2)
BILIRUB UR-MCNC: NEGATIVE — SIGNIFICANT CHANGE UP
BLD GP AB SCN SERPL QL: SIGNIFICANT CHANGE UP
BUN SERPL-MCNC: 26 MG/DL — HIGH (ref 7–23)
CALCIUM SERPL-MCNC: 9.2 MG/DL — SIGNIFICANT CHANGE UP (ref 8.5–10.1)
CHLORIDE SERPL-SCNC: 104 MMOL/L — SIGNIFICANT CHANGE UP (ref 96–108)
CO2 SERPL-SCNC: 24 MMOL/L — SIGNIFICANT CHANGE UP (ref 22–31)
COLOR SPEC: YELLOW — SIGNIFICANT CHANGE UP
CREAT SERPL-MCNC: 7.1 MG/DL — HIGH (ref 0.5–1.3)
DACRYOCYTES BLD QL SMEAR: SLIGHT — SIGNIFICANT CHANGE UP
DIFF PNL FLD: ABNORMAL
EOSINOPHIL # BLD AUTO: 1.4 K/UL — HIGH (ref 0–0.5)
EOSINOPHIL NFR BLD AUTO: 18 % — HIGH (ref 0–6)
EPI CELLS # UR: SIGNIFICANT CHANGE UP
GLUCOSE SERPL-MCNC: 106 MG/DL — HIGH (ref 70–99)
GLUCOSE UR QL: 50 MG/DL
HCG SERPL-ACNC: 5 MIU/ML — SIGNIFICANT CHANGE UP
HCT VFR BLD CALC: 30.2 % — LOW (ref 34.5–45)
HGB BLD-MCNC: 9.4 G/DL — LOW (ref 11.5–15.5)
HYPOCHROMIA BLD QL: SLIGHT — SIGNIFICANT CHANGE UP
INR BLD: 1 RATIO — SIGNIFICANT CHANGE UP (ref 0.88–1.16)
KETONES UR-MCNC: NEGATIVE — SIGNIFICANT CHANGE UP
LEUKOCYTE ESTERASE UR-ACNC: ABNORMAL
LYMPHOCYTES # BLD AUTO: 1.7 K/UL — SIGNIFICANT CHANGE UP (ref 1–3.3)
LYMPHOCYTES # BLD AUTO: 10 % — LOW (ref 13–44)
MCHC RBC-ENTMCNC: 28.7 PG — SIGNIFICANT CHANGE UP (ref 27–34)
MCHC RBC-ENTMCNC: 31.1 GM/DL — LOW (ref 32–36)
MCV RBC AUTO: 92.5 FL — SIGNIFICANT CHANGE UP (ref 80–100)
MICROCYTES BLD QL: SLIGHT — SIGNIFICANT CHANGE UP
MONOCYTES # BLD AUTO: 0.7 K/UL — SIGNIFICANT CHANGE UP (ref 0–0.9)
MONOCYTES NFR BLD AUTO: 7 % — SIGNIFICANT CHANGE UP (ref 2–14)
NEUTROPHILS # BLD AUTO: 5.4 K/UL — SIGNIFICANT CHANGE UP (ref 1.8–7.4)
NEUTROPHILS NFR BLD AUTO: 62 % — SIGNIFICANT CHANGE UP (ref 43–77)
NITRITE UR-MCNC: POSITIVE
PH UR: 8 — SIGNIFICANT CHANGE UP (ref 5–8)
PLAT MORPH BLD: NORMAL — SIGNIFICANT CHANGE UP
PLATELET # BLD AUTO: 150 K/UL — SIGNIFICANT CHANGE UP (ref 150–400)
POTASSIUM SERPL-MCNC: 5.2 MMOL/L — SIGNIFICANT CHANGE UP (ref 3.5–5.3)
POTASSIUM SERPL-SCNC: 5.2 MMOL/L — SIGNIFICANT CHANGE UP (ref 3.5–5.3)
PROT SERPL-MCNC: 7.4 GM/DL — SIGNIFICANT CHANGE UP (ref 6–8.3)
PROT UR-MCNC: 500 MG/DL
PROTHROM AB SERPL-ACNC: 10.9 SEC — SIGNIFICANT CHANGE UP (ref 9.8–12.7)
RBC # BLD: 3.26 M/UL — LOW (ref 3.8–5.2)
RBC # FLD: 18.4 % — HIGH (ref 11–15)
RBC BLD AUTO: ABNORMAL
RBC CASTS # UR COMP ASSIST: >50 /HPF (ref 0–4)
ROULEAUX BLD QL SMEAR: PRESENT — SIGNIFICANT CHANGE UP
SODIUM SERPL-SCNC: 138 MMOL/L — SIGNIFICANT CHANGE UP (ref 135–145)
SP GR SPEC: 1.01 — SIGNIFICANT CHANGE UP (ref 1.01–1.02)
UROBILINOGEN FLD QL: NEGATIVE MG/DL — SIGNIFICANT CHANGE UP
VARIANT LYMPHS # BLD: 3 % — SIGNIFICANT CHANGE UP (ref 0–6)
WBC # BLD: 9.2 K/UL — SIGNIFICANT CHANGE UP (ref 3.8–10.5)
WBC # FLD AUTO: 9.2 K/UL — SIGNIFICANT CHANGE UP (ref 3.8–10.5)
WBC UR QL: >50

## 2018-01-11 PROCEDURE — 93010 ELECTROCARDIOGRAM REPORT: CPT | Mod: NC

## 2018-01-11 NOTE — H&P PST ADULT - HISTORY OF PRESENT ILLNESS
46 YO F with a sig PMHx of HTN and kidney transplant in 12/2005, now with ESRD on dialysis, and dialysis catheter in R chest, presenting for Presurgical testing for DILATION CURETTAGE SURGICAL LEEP NOVA SURE ABLATION, due to heavy bleeding leading to anemia. Denies CP, SOB, palpitations, headache, dizziness, fever, chills, sweats, abdominal pain, nausea, vomiting, diarrhea, constipation, dysuria, urgency or frequency.

## 2018-01-11 NOTE — ASU PATIENT PROFILE, ADULT - PSH
H/O hernia repair  Abdominal ( 2017 )  History of renal transplant    S/P dialysis catheter insertion  Right chest  Tubal ligation status  12 years ago

## 2018-01-11 NOTE — H&P PST ADULT - ASSESSMENT
46 YO F with a sig PMHx of HTN and kidney transplant in 12/2005, now with ESRD on dialysis, and dialysis catheter in R chest, presenting for Presurgical testing for DILATION CURETTAGE SURGICAL LEEP NOVA SURE ABLATION, due to menorrhagia, and anemia

## 2018-01-11 NOTE — ASU PATIENT PROFILE, ADULT - PMH
Cerebral edema    Dialysis patient    ESRD (end stage renal disease)    History of renal transplant    HTN - Hypertension

## 2018-01-13 LAB
CULTURE RESULTS: SIGNIFICANT CHANGE UP
SPECIMEN SOURCE: SIGNIFICANT CHANGE UP

## 2018-01-17 ENCOUNTER — OUTPATIENT (OUTPATIENT)
Dept: OUTPATIENT SERVICES | Facility: HOSPITAL | Age: 48
LOS: 1 days | Discharge: HOME | End: 2018-01-17
Payer: COMMERCIAL

## 2018-01-17 ENCOUNTER — RESULT REVIEW (OUTPATIENT)
Age: 48
End: 2018-01-17

## 2018-01-17 VITALS
DIASTOLIC BLOOD PRESSURE: 99 MMHG | TEMPERATURE: 97 F | SYSTOLIC BLOOD PRESSURE: 164 MMHG | HEIGHT: 66 IN | OXYGEN SATURATION: 99 % | HEART RATE: 65 BPM | RESPIRATION RATE: 18 BRPM | WEIGHT: 158.07 LBS

## 2018-01-17 VITALS
DIASTOLIC BLOOD PRESSURE: 81 MMHG | TEMPERATURE: 98 F | OXYGEN SATURATION: 100 % | RESPIRATION RATE: 14 BRPM | HEART RATE: 68 BPM | SYSTOLIC BLOOD PRESSURE: 139 MMHG

## 2018-01-17 DIAGNOSIS — Z99.2 DEPENDENCE ON RENAL DIALYSIS: Chronic | ICD-10-CM

## 2018-01-17 DIAGNOSIS — Z98.890 OTHER SPECIFIED POSTPROCEDURAL STATES: Chronic | ICD-10-CM

## 2018-01-17 PROCEDURE — 88305 TISSUE EXAM BY PATHOLOGIST: CPT | Mod: 26

## 2018-01-17 PROCEDURE — 88307 TISSUE EXAM BY PATHOLOGIST: CPT | Mod: 26

## 2018-01-17 RX ORDER — MORPHINE SULFATE 50 MG/1
2 CAPSULE, EXTENDED RELEASE ORAL
Qty: 0 | Refills: 0 | Status: DISCONTINUED | OUTPATIENT
Start: 2018-01-17 | End: 2018-01-17

## 2018-01-17 RX ORDER — ONDANSETRON 8 MG/1
4 TABLET, FILM COATED ORAL ONCE
Qty: 0 | Refills: 0 | Status: DISCONTINUED | OUTPATIENT
Start: 2018-01-17 | End: 2018-01-17

## 2018-01-17 RX ORDER — ACETAMINOPHEN 500 MG
1000 TABLET ORAL ONCE
Qty: 0 | Refills: 0 | Status: COMPLETED | OUTPATIENT
Start: 2018-01-17 | End: 2018-01-17

## 2018-01-17 RX ORDER — SODIUM CHLORIDE 9 MG/ML
1000 INJECTION, SOLUTION INTRAVENOUS
Qty: 0 | Refills: 0 | Status: DISCONTINUED | OUTPATIENT
Start: 2018-01-17 | End: 2018-01-17

## 2018-01-17 RX ADMIN — Medication 1000 MILLIGRAM(S): at 17:45

## 2018-01-17 RX ADMIN — Medication 400 MILLIGRAM(S): at 17:23

## 2018-01-17 RX ADMIN — SODIUM CHLORIDE 45 MILLILITER(S): 9 INJECTION, SOLUTION INTRAVENOUS at 17:24

## 2018-01-17 NOTE — BRIEF OPERATIVE NOTE - PROCEDURE
<<-----Click on this checkbox to enter Procedure LEEP, with endocervical curettage  01/17/2018    Active  [facility]  Dilatation and curettage  01/17/2018    Active  [facility]  Polypectomy of uterus using hysteroscopic tissue removal system with disposable rotating reciprocating cutting blade  01/17/2018    Active  [facility]  Hysteroscopy endometrial ablation  01/17/2018    Active  [facility]

## 2018-01-17 NOTE — BRIEF OPERATIVE NOTE - PRE-OP DX
Cervical dysplasia  01/17/2018  JEFFERY I to JEFFERY II  Active  [facility] Delong  Menorrhagia  01/17/2018    Active  Ping Delong

## 2018-01-17 NOTE — ASU DISCHARGE PLAN (ADULT/PEDIATRIC). - SPECIAL INSTRUCTIONS
call your doctor immediately if you have persistent nausea or vomiting, more pain than expected, fever greater than 101 F (possible infection) and, or excessive bleeding (dressing completely bloody)

## 2018-01-17 NOTE — BRIEF OPERATIVE NOTE - POST-OP DX
Cervical dysplasia  01/17/2018    Active  Ping Delong  Endometrial polyp  01/17/2018    Ping Price  Menorrhagia with irregular cycle  01/17/2018    Ping Price

## 2018-01-18 ENCOUNTER — TRANSCRIPTION ENCOUNTER (OUTPATIENT)
Age: 48
End: 2018-01-18

## 2018-01-22 ENCOUNTER — INPATIENT (INPATIENT)
Facility: HOSPITAL | Age: 48
LOS: 1 days | Discharge: HOME | End: 2018-01-24
Attending: INTERNAL MEDICINE | Admitting: INTERNAL MEDICINE
Payer: COMMERCIAL

## 2018-01-22 VITALS
RESPIRATION RATE: 16 BRPM | HEART RATE: 78 BPM | WEIGHT: 156.09 LBS | OXYGEN SATURATION: 98 % | TEMPERATURE: 98 F | DIASTOLIC BLOOD PRESSURE: 104 MMHG | HEIGHT: 66 IN | SYSTOLIC BLOOD PRESSURE: 177 MMHG

## 2018-01-22 DIAGNOSIS — Z99.2 DEPENDENCE ON RENAL DIALYSIS: Chronic | ICD-10-CM

## 2018-01-22 DIAGNOSIS — R11.2 NAUSEA WITH VOMITING, UNSPECIFIED: ICD-10-CM

## 2018-01-22 DIAGNOSIS — D50.0 IRON DEFICIENCY ANEMIA SECONDARY TO BLOOD LOSS (CHRONIC): ICD-10-CM

## 2018-01-22 DIAGNOSIS — Z98.890 OTHER SPECIFIED POSTPROCEDURAL STATES: Chronic | ICD-10-CM

## 2018-01-22 DIAGNOSIS — N18.6 END STAGE RENAL DISEASE: ICD-10-CM

## 2018-01-22 LAB
ALBUMIN SERPL ELPH-MCNC: 3.2 G/DL — LOW (ref 3.3–5)
ALP SERPL-CCNC: 63 U/L — SIGNIFICANT CHANGE UP (ref 40–120)
ALT FLD-CCNC: 13 U/L — SIGNIFICANT CHANGE UP (ref 12–78)
ANION GAP SERPL CALC-SCNC: 13 MMOL/L — SIGNIFICANT CHANGE UP (ref 5–17)
APTT BLD: 32.4 SEC — SIGNIFICANT CHANGE UP (ref 27.5–37.4)
AST SERPL-CCNC: 22 U/L — SIGNIFICANT CHANGE UP (ref 15–37)
BILIRUB SERPL-MCNC: 0.3 MG/DL — SIGNIFICANT CHANGE UP (ref 0.2–1.2)
BUN SERPL-MCNC: 102 MG/DL — HIGH (ref 7–23)
CALCIUM SERPL-MCNC: 8.9 MG/DL — SIGNIFICANT CHANGE UP (ref 8.5–10.1)
CHLORIDE SERPL-SCNC: 95 MMOL/L — LOW (ref 96–108)
CO2 SERPL-SCNC: 29 MMOL/L — SIGNIFICANT CHANGE UP (ref 22–31)
CREAT SERPL-MCNC: 11.9 MG/DL — HIGH (ref 0.5–1.3)
FLUAV SPEC QL CULT: NEGATIVE — SIGNIFICANT CHANGE UP
FLUBV AG SPEC QL IA: NEGATIVE — SIGNIFICANT CHANGE UP
GLUCOSE SERPL-MCNC: 114 MG/DL — HIGH (ref 70–99)
HCT VFR BLD CALC: 14.9 % — CRITICAL LOW (ref 34.5–45)
HGB BLD-MCNC: 5.1 G/DL — CRITICAL LOW (ref 11.5–15.5)
INR BLD: 1.02 RATIO — SIGNIFICANT CHANGE UP (ref 0.88–1.16)
MCHC RBC-ENTMCNC: 30.8 PG — SIGNIFICANT CHANGE UP (ref 27–34)
MCHC RBC-ENTMCNC: 33.9 GM/DL — SIGNIFICANT CHANGE UP (ref 32–36)
MCV RBC AUTO: 90.9 FL — SIGNIFICANT CHANGE UP (ref 80–100)
PLATELET # BLD AUTO: 192 K/UL — SIGNIFICANT CHANGE UP (ref 150–400)
POTASSIUM SERPL-MCNC: 5.1 MMOL/L — SIGNIFICANT CHANGE UP (ref 3.5–5.3)
POTASSIUM SERPL-SCNC: 5.1 MMOL/L — SIGNIFICANT CHANGE UP (ref 3.5–5.3)
PROT SERPL-MCNC: 6.8 GM/DL — SIGNIFICANT CHANGE UP (ref 6–8.3)
PROTHROM AB SERPL-ACNC: 11.1 SEC — SIGNIFICANT CHANGE UP (ref 9.8–12.7)
RBC # BLD: 1.64 M/UL — LOW (ref 3.8–5.2)
RBC # FLD: 20.8 % — HIGH (ref 11–15)
SODIUM SERPL-SCNC: 137 MMOL/L — SIGNIFICANT CHANGE UP (ref 135–145)
TROPONIN I SERPL-MCNC: 0.02 NG/ML — SIGNIFICANT CHANGE UP (ref 0.01–0.04)
WBC # BLD: 14 K/UL — HIGH (ref 3.8–10.5)
WBC # FLD AUTO: 14 K/UL — HIGH (ref 3.8–10.5)

## 2018-01-22 PROCEDURE — 70450 CT HEAD/BRAIN W/O DYE: CPT | Mod: 26

## 2018-01-22 PROCEDURE — 99284 EMERGENCY DEPT VISIT MOD MDM: CPT | Mod: 25

## 2018-01-22 PROCEDURE — 93010 ELECTROCARDIOGRAM REPORT: CPT

## 2018-01-22 PROCEDURE — 99223 1ST HOSP IP/OBS HIGH 75: CPT

## 2018-01-22 RX ORDER — ONDANSETRON 8 MG/1
4 TABLET, FILM COATED ORAL EVERY 6 HOURS
Qty: 0 | Refills: 0 | Status: DISCONTINUED | OUTPATIENT
Start: 2018-01-22 | End: 2018-01-24

## 2018-01-22 RX ORDER — CARVEDILOL PHOSPHATE 80 MG/1
25 CAPSULE, EXTENDED RELEASE ORAL EVERY 12 HOURS
Qty: 0 | Refills: 0 | Status: DISCONTINUED | OUTPATIENT
Start: 2018-01-22 | End: 2018-01-24

## 2018-01-22 RX ORDER — CHLORHEXIDINE GLUCONATE 213 G/1000ML
1 SOLUTION TOPICAL DAILY
Qty: 0 | Refills: 0 | Status: DISCONTINUED | OUTPATIENT
Start: 2018-01-22 | End: 2018-01-24

## 2018-01-22 RX ORDER — FAMOTIDINE 10 MG/ML
20 INJECTION INTRAVENOUS ONCE
Qty: 0 | Refills: 0 | Status: COMPLETED | OUTPATIENT
Start: 2018-01-22 | End: 2018-01-22

## 2018-01-22 RX ORDER — NIFEDIPINE 30 MG
90 TABLET, EXTENDED RELEASE 24 HR ORAL DAILY
Qty: 0 | Refills: 0 | Status: DISCONTINUED | OUTPATIENT
Start: 2018-01-22 | End: 2018-01-24

## 2018-01-22 RX ORDER — METOCLOPRAMIDE HCL 10 MG
10 TABLET ORAL ONCE
Qty: 0 | Refills: 0 | Status: COMPLETED | OUTPATIENT
Start: 2018-01-22 | End: 2018-01-22

## 2018-01-22 RX ORDER — HYDRALAZINE HCL 50 MG
100 TABLET ORAL EVERY 8 HOURS
Qty: 0 | Refills: 0 | Status: DISCONTINUED | OUTPATIENT
Start: 2018-01-22 | End: 2018-01-24

## 2018-01-22 RX ORDER — TACROLIMUS 5 MG/1
5 CAPSULE ORAL DAILY
Qty: 0 | Refills: 0 | Status: DISCONTINUED | OUTPATIENT
Start: 2018-01-22 | End: 2018-01-24

## 2018-01-22 RX ADMIN — TACROLIMUS 5 MILLIGRAM(S): 5 CAPSULE ORAL at 13:21

## 2018-01-22 RX ADMIN — CARVEDILOL PHOSPHATE 25 MILLIGRAM(S): 80 CAPSULE, EXTENDED RELEASE ORAL at 23:36

## 2018-01-22 RX ADMIN — Medication 0.2 MILLIGRAM(S): at 23:37

## 2018-01-22 RX ADMIN — Medication 10 MILLIGRAM(S): at 08:42

## 2018-01-22 RX ADMIN — Medication 100 MILLIGRAM(S): at 23:36

## 2018-01-22 RX ADMIN — FAMOTIDINE 20 MILLIGRAM(S): 10 INJECTION INTRAVENOUS at 08:42

## 2018-01-22 NOTE — ED PROVIDER NOTE - PROGRESS NOTE DETAILS
results explained to patient, low hgb of ~5, pt. says likely from vaginal bleeding cumulatively for last 3 months (which caused ob/gyn to do hysteroscopy)  pt. agrees to blood transfusion and admission, attempted to call pcp, no answer at this time, number listed in HPI results explained to patient, low hgb of ~5, pt. says likely from vaginal bleeding cumulatively for last 3 months (which caused ob/gyn to do hysteroscopy)  pt. agrees to blood transfusion and admission, attempted to call pcp, no answer at this time, number listed in HPI  will admit to tele, will transfuse two units now

## 2018-01-22 NOTE — H&P ADULT - HISTORY OF PRESENT ILLNESS
48 yo F with vomiting and decreased PO intake.  Pt. says she had a hysteroscopy done last Wednesday and she hasn't really eaten anything since.  She notes she had a mild allergic reaction 2/2 anesthesia from the procedure on Thursday--she was given prednisone.  On Friday at dialysis she got an iron injection which made her nauseas.  Two nights ago she vomited.  She just doesn't feel like eating.  No other complaints, currently.  	ROS: negative for fever, cough, headache, chest pain, shortness of breath, abd pain, diarrhea, rash, paresthesia, and weakness.

## 2018-01-22 NOTE — ED PROVIDER NOTE - MEDICAL DECISION MAKING DETAILS
46 yo F with nausea and vomiting, prob unrelated to hysteroscopy as pt. has no pain, possible electrolyte/dialysis related, will contact pcp  -basic labs, coags, pepcid, reglan  -contact pcp

## 2018-01-22 NOTE — ED ADULT NURSE NOTE - OBJECTIVE STATEMENT
AO X3 ,working at [employer] ,c/o weakness x few days , but still came to work , weakness got worse , had a hysteroscopy done on Thursday  and ever since then she is not able to hold any food down . .mild  Discomfort in the stomach , otherwise no pain .Her co- workers  assisted her to ED IN W/C.on dialysis , with dialysis  access on right chest wall , dialysis days are Monday/ Wednesday / Friday .

## 2018-01-22 NOTE — ED PROVIDER NOTE - OBJECTIVE STATEMENT
48 yo F with vomiting and decreased PO intake.  Pt. says she had a hysteroscopy done last Wednesday and she hasn't really eaten anything since.  She notes she had a mild allergic reaction 2/2 anesthesia from the procedure on Thursday--she was given prednisone.  On Friday at dialysis she got an iron injection which made her nauseas.  Two nights ago she vomited.  She just doesn't feel like eating.  No other complaints, currently.  ROS: negative for fever, cough, headache, chest pain, shortness of breath, abd pain, diarrhea, rash, paresthesia, and weakness.   PMH: HTN, dialysis dependent MWF, hx renal transplant (13 years ago); Meds: prograf 5, clonidine 0.2 and patch, hydralazine, nifedipine 90, coreg, clonidine; SH: Denies smoking/drinking/drug use   Nephro: Dr. Donald Cali [phone]  Cardio: Bijal Angela 46 yo F with vomiting and decreased PO intake.  Pt. says she had a hysteroscopy done last Wednesday and she hasn't really eaten anything since.  She notes she had a mild allergic reaction 2/2 anesthesia from the procedure on Thursday--she was given prednisone.  On Friday at dialysis she got an iron injection which made her nauseas.  Two nights ago she vomited.  She just doesn't feel like eating.  No other complaints, currently.  ROS: negative for fever, cough, headache, chest pain, shortness of breath, abd pain, diarrhea, rash, paresthesia, and weakness.   PMH: HTN, dialysis dependent MWF, hx renal transplant (13 years ago); Meds: prograf 5, clonidine 0.2 and patch, hydralazine, nifedipine 90, coreg, clonidine; SH: Denies smoking/drinking/drug use   PCP: Dr. Donald Cali, [phone]  Nephro: Dr. Donald Cali [phone]  Cardio: Bijal Angela

## 2018-01-22 NOTE — ED ADULT TRIAGE NOTE - CHIEF COMPLAINT QUOTE
pt states she had a hysteroscopy done on Thursday  and ever since then she is not able to hold any food down .

## 2018-01-22 NOTE — CONSULT NOTE ADULT - SUBJECTIVE AND OBJECTIVE BOX
Information from chart:  "Patient is a 47y old  Female who presents with a chief complaint of vomiting (22 Jan 2018 11:36)    HPI:  48 yo F with vomiting and decreased PO intake.  Pt. says she had a hysteroscopy done last Wednesday and she hasn't really eaten anything since.  She notes she had a mild allergic reaction 2/2 anesthesia from the procedure on Thursday--she was given prednisone.  On Friday at dialysis she got an iron injection which made her nauseas.  Two nights ago she vomited.  She just doesn't feel like eating.  No other complaints, currently.  	ROS: negative for fever, cough, headache, chest pain, shortness of breath, abd pain, diarrhea, rash, paresthesia, and weakness. (22 Jan 2018 11:36)   "    PAST MEDICAL & SURGICAL HISTORY:  Dialysis patient  Cerebral edema  History of renal transplant  ESRD (end stage renal disease)  HTN - Hypertension  S/P dialysis catheter insertion: Right chest  H/O hernia repair: Abdominal ( 2017 )  Tubal ligation status: 12 years ago  History of renal transplant    FAMILY HISTORY:    Allergies    Dilaudid (Flushing (Severe); Short breath (Mild to Mod); Faint (Mod to Severe))    Intolerances      Home Medications:  clonidine: 0.3  transdermal once a week (22 Jan 2018 07:54)  cloNIDine 0.2 mg oral tablet: 1 tab(s) orally every 8 hours (22 Jan 2018 07:54)  hydrALAZINE 100 mg oral tablet: 1 tab(s) orally every 8 hours (22 Jan 2018 07:54)  NIFEdipine 90 mg oral tablet, extended release: 1 tab(s) orally once a day (22 Jan 2018 07:54)  Renvela 800 mg oral tablet: 1 tab(s) orally 3 times a day (with meals) (22 Jan 2018 07:54)  tacrolimus 5 mg oral capsule: 1 cap(s) orally every 12 hours (22 Jan 2018 07:54)    MEDICATIONS  (STANDING):  tacrolimus 5 milliGRAM(s) Oral daily    MEDICATIONS  (PRN):  ondansetron Injectable 4 milliGRAM(s) IV Push every 6 hours PRN Nausea    Vital Signs Last 24 Hrs  T(C): 37 (22 Jan 2018 12:11), Max: 37.2 (22 Jan 2018 11:14)  T(F): 98.6 (22 Jan 2018 12:11), Max: 99 (22 Jan 2018 11:14)  HR: 80 (22 Jan 2018 12:11) (78 - 82)  BP: 138/89 (22 Jan 2018 12:11) (135/72 - 177/104)  BP(mean): --  RR: 17 (22 Jan 2018 12:11) (16 - 17)  SpO2: 100% (22 Jan 2018 12:11) (98% - 100%)    Daily Height in cm: 167.64 (22 Jan 2018 07:36)    Daily   CAPILLARY BLOOD GLUCOSE        PHYSICAL EXAM:      T(C): 37 (01-22-18 @ 12:11), Max: 37.2 (01-22-18 @ 11:14)  HR: 80 (01-22-18 @ 12:11) (78 - 82)  BP: 138/89 (01-22-18 @ 12:11) (135/72 - 177/104)  RR: 17 (01-22-18 @ 12:11) (16 - 17)  SpO2: 100% (01-22-18 @ 12:11) (98% - 100%)  Wt(kg): --  Respiratory: clear anteriorly, decreased BS at bases  Cardiovascular: S1 S2  Gastrointestinal: soft NT ND +BS  Extremities:   1 edema              01-22    137  |  95<L>  |  102<H>  ----------------------------<  114<H>  5.1   |  29  |  11.90<H>    Ca    8.9      22 Jan 2018 08:35    TPro  6.8  /  Alb  3.2<L>  /  TBili  0.3  /  DBili  x   /  AST  22  /  ALT  13  /  AlkPhos  63  01-22                          5.1    14.0  )-----------( 192      ( 22 Jan 2018 08:35 )             14.9       Assessment and Plan    ESRD; vaginal bleeding;  HD today with PRBC;  Continue BP medications as readings very high in the past, difficult to manage.  Will follow.

## 2018-01-22 NOTE — H&P ADULT - ASSESSMENT
47f with end stage renal disease with symptomatic anemia     IMPROVE VTE Individual Risk Assessment          RISK                                                          Points    [  ] Previous VTE                                                3    [  ] Thrombophilia                                             2    [  ] Lower limb paralysis                                    2        (unable to hold up >15 seconds)      [  ] Current Cancer                                             2         (within 6 months)    [  ] Immobilization > 24 hrs                              1    [  ] ICU/CCU stay > 24 hours                            1    [  ] Age > 60                                                    1    IMPROVE VTE Score _________

## 2018-01-22 NOTE — PATIENT PROFILE ADULT. - ANESTHESIA, PREVIOUS REACTION, PROFILE
Pt is calling to request an order for PT as pt continues to have right shoulder discomfort.    Pt states last summer, she felt a pop in her right shoulder and has been trying to manage her sx, pt now has limited range of motion in that joint area.  Pt denies injury to area, pt did not fall.    Pt states she had worked with a therapist in the past and feels as though if she worked with a therapist again she may have relief from sx.    Pt resides as [facility] and states she is able to see a therapist at that site, however, pt needs to have an order from MD to resume PT.    Would MD recommend pt be seen, right shoulder XR, or would MD recommend pt work with PT to improve range of motion?        none

## 2018-01-22 NOTE — ED PROVIDER NOTE - PHYSICAL EXAMINATION
Vitals: HTN at 177/104, otherwise WNL  Gen: AAOx3, NAD, sitting comfortably in stretcher, non-toxic, no active vomiting  Head: ncat, perrla, eomi b/l  Neck: supple, no lymphadenopathy, no midline deviation  Heart: rrr, no m/r/g  Lungs: CTA b/l, no rales/ronchi/wheezes  Abd: soft, nontender, non-distended, no rebound or guarding  Ext: no clubbing/cyanosis/edema  Neuro: sensation and muscle strength intact b/l, steady gait

## 2018-01-22 NOTE — H&P ADULT - NSHPLABSRESULTS_GEN_ALL_CORE
5.1    14.0  )-----------( 192      ( 22 Jan 2018 08:35 )             14.9   01-22    137  |  95<L>  |  102<H>  ----------------------------<  114<H>  5.1   |  29  |  11.90<H>    Ca    8.9      22 Jan 2018 08:35    TPro  6.8  /  Alb  3.2<L>  /  TBili  0.3  /  DBili  x   /  AST  22  /  ALT  13  /  AlkPhos  63  01-22  < from: CT Head No Cont (01.22.18 @ 09:07) >      unremarkable CT study of the brain  2)  clear sinuses and mastoids..

## 2018-01-23 DIAGNOSIS — N87.0 MILD CERVICAL DYSPLASIA: ICD-10-CM

## 2018-01-23 DIAGNOSIS — N84.0 POLYP OF CORPUS UTERI: ICD-10-CM

## 2018-01-23 DIAGNOSIS — N18.6 END STAGE RENAL DISEASE: ICD-10-CM

## 2018-01-23 DIAGNOSIS — I12.0 HYPERTENSIVE CHRONIC KIDNEY DISEASE WITH STAGE 5 CHRONIC KIDNEY DISEASE OR END STAGE RENAL DISEASE: ICD-10-CM

## 2018-01-23 DIAGNOSIS — Z94.0 KIDNEY TRANSPLANT STATUS: ICD-10-CM

## 2018-01-23 DIAGNOSIS — D50.0 IRON DEFICIENCY ANEMIA SECONDARY TO BLOOD LOSS (CHRONIC): ICD-10-CM

## 2018-01-23 LAB
ANION GAP SERPL CALC-SCNC: 9 MMOL/L — SIGNIFICANT CHANGE UP (ref 5–17)
APPEARANCE UR: ABNORMAL
BILIRUB UR-MCNC: NEGATIVE — SIGNIFICANT CHANGE UP
BUN SERPL-MCNC: 38 MG/DL — HIGH (ref 7–23)
CALCIUM SERPL-MCNC: 8.4 MG/DL — LOW (ref 8.5–10.1)
CHLORIDE SERPL-SCNC: 100 MMOL/L — SIGNIFICANT CHANGE UP (ref 96–108)
CO2 SERPL-SCNC: 31 MMOL/L — SIGNIFICANT CHANGE UP (ref 22–31)
COLOR SPEC: YELLOW — SIGNIFICANT CHANGE UP
CREAT SERPL-MCNC: 6.71 MG/DL — HIGH (ref 0.5–1.3)
DIFF PNL FLD: ABNORMAL
GLUCOSE SERPL-MCNC: 94 MG/DL — SIGNIFICANT CHANGE UP (ref 70–99)
GLUCOSE UR QL: NEGATIVE MG/DL — SIGNIFICANT CHANGE UP
HCT VFR BLD CALC: 20.5 % — CRITICAL LOW (ref 34.5–45)
HCT VFR BLD CALC: 21.7 % — LOW (ref 34.5–45)
HGB BLD-MCNC: 7.1 G/DL — LOW (ref 11.5–15.5)
HGB BLD-MCNC: 7.3 G/DL — LOW (ref 11.5–15.5)
KETONES UR-MCNC: NEGATIVE — SIGNIFICANT CHANGE UP
LEUKOCYTE ESTERASE UR-ACNC: ABNORMAL
MCHC RBC-ENTMCNC: 30.5 PG — SIGNIFICANT CHANGE UP (ref 27–34)
MCHC RBC-ENTMCNC: 31 PG — SIGNIFICANT CHANGE UP (ref 27–34)
MCHC RBC-ENTMCNC: 33.8 GM/DL — SIGNIFICANT CHANGE UP (ref 32–36)
MCHC RBC-ENTMCNC: 34.6 GM/DL — SIGNIFICANT CHANGE UP (ref 32–36)
MCV RBC AUTO: 89.5 FL — SIGNIFICANT CHANGE UP (ref 80–100)
MCV RBC AUTO: 90.3 FL — SIGNIFICANT CHANGE UP (ref 80–100)
NITRITE UR-MCNC: NEGATIVE — SIGNIFICANT CHANGE UP
PH UR: 8 — SIGNIFICANT CHANGE UP (ref 5–8)
PLATELET # BLD AUTO: 155 K/UL — SIGNIFICANT CHANGE UP (ref 150–400)
PLATELET # BLD AUTO: 159 K/UL — SIGNIFICANT CHANGE UP (ref 150–400)
POTASSIUM SERPL-MCNC: 4.1 MMOL/L — SIGNIFICANT CHANGE UP (ref 3.5–5.3)
POTASSIUM SERPL-SCNC: 4.1 MMOL/L — SIGNIFICANT CHANGE UP (ref 3.5–5.3)
PROT UR-MCNC: 100 MG/DL
RBC # BLD: 2.29 M/UL — LOW (ref 3.8–5.2)
RBC # BLD: 2.4 M/UL — LOW (ref 3.8–5.2)
RBC # FLD: 19.3 % — HIGH (ref 10.3–14.5)
RBC # FLD: 19.6 % — HIGH (ref 11–15)
SODIUM SERPL-SCNC: 140 MMOL/L — SIGNIFICANT CHANGE UP (ref 135–145)
SP GR SPEC: 1.01 — SIGNIFICANT CHANGE UP (ref 1.01–1.02)
UROBILINOGEN FLD QL: NEGATIVE MG/DL — SIGNIFICANT CHANGE UP
WBC # BLD: 10.1 K/UL — SIGNIFICANT CHANGE UP (ref 3.8–10.5)
WBC # BLD: 10.18 K/UL — SIGNIFICANT CHANGE UP (ref 3.8–10.5)
WBC # FLD AUTO: 10.1 K/UL — SIGNIFICANT CHANGE UP (ref 3.8–10.5)
WBC # FLD AUTO: 10.18 K/UL — SIGNIFICANT CHANGE UP (ref 3.8–10.5)

## 2018-01-23 PROCEDURE — 99233 SBSQ HOSP IP/OBS HIGH 50: CPT

## 2018-01-23 RX ORDER — SEVELAMER CARBONATE 2400 MG/1
800 POWDER, FOR SUSPENSION ORAL
Qty: 0 | Refills: 0 | Status: DISCONTINUED | OUTPATIENT
Start: 2018-01-23 | End: 2018-01-24

## 2018-01-23 RX ADMIN — CARVEDILOL PHOSPHATE 25 MILLIGRAM(S): 80 CAPSULE, EXTENDED RELEASE ORAL at 05:40

## 2018-01-23 RX ADMIN — Medication 90 MILLIGRAM(S): at 05:40

## 2018-01-23 RX ADMIN — SEVELAMER CARBONATE 800 MILLIGRAM(S): 2400 POWDER, FOR SUSPENSION ORAL at 18:22

## 2018-01-23 RX ADMIN — CARVEDILOL PHOSPHATE 25 MILLIGRAM(S): 80 CAPSULE, EXTENDED RELEASE ORAL at 18:22

## 2018-01-23 RX ADMIN — Medication 100 MILLIGRAM(S): at 22:38

## 2018-01-23 RX ADMIN — CHLORHEXIDINE GLUCONATE 1 APPLICATION(S): 213 SOLUTION TOPICAL at 11:48

## 2018-01-23 RX ADMIN — TACROLIMUS 5 MILLIGRAM(S): 5 CAPSULE ORAL at 11:47

## 2018-01-23 RX ADMIN — Medication 0.2 MILLIGRAM(S): at 22:38

## 2018-01-23 RX ADMIN — Medication 0.2 MILLIGRAM(S): at 05:40

## 2018-01-23 NOTE — PROGRESS NOTE ADULT - SUBJECTIVE AND OBJECTIVE BOX
Patient seen in follow up for ESRD; no distress.    MEDICATIONS  (STANDING):  carvedilol 25 milliGRAM(s) Oral every 12 hours  chlorhexidine 2% Cloths 1 Application(s) Topical daily  cloNIDine 0.2 milliGRAM(s) Oral every 8 hours  hydrALAZINE 100 milliGRAM(s) Oral every 8 hours  NIFEdipine XL 90 milliGRAM(s) Oral daily  sevelamer hydrochloride 800 milliGRAM(s) Oral three times a day with meals  tacrolimus 5 milliGRAM(s) Oral daily    MEDICATIONS  (PRN):  ondansetron Injectable 4 milliGRAM(s) IV Push every 6 hours PRN Nausea    PHYSICAL EXAM:      T(C): 36.1 (01-23-18 @ 11:14), Max: 37.2 (01-22-18 @ 22:00)  HR: 67 (01-23-18 @ 11:14) (67 - 83)  BP: 113/61 (01-23-18 @ 11:14) (113/61 - 200/102)  RR: 18 (01-23-18 @ 11:14) (17 - 20)  SpO2: 100% (01-23-18 @ 11:14) (95% - 100%)  Wt(kg): --  Respiratory: clear anteriorly, decreased BS at bases  Cardiovascular: S1 S2  Gastrointestinal: soft NT ND +BS  Extremities:   1 edema                                    7.1    10.18 )-----------( 159      ( 23 Jan 2018 14:05 )             20.5     01-23    140  |  100  |  38<H>  ----------------------------<  94  4.1   |  31  |  6.71<H>    Ca    8.4<L>      23 Jan 2018 07:29    TPro  6.8  /  Alb  3.2<L>  /  TBili  0.3  /  DBili  x   /  AST  22  /  ALT  13  /  AlkPhos  63  01-22      LIVER FUNCTIONS - ( 22 Jan 2018 08:35 )  Alb: 3.2 g/dL / Pro: 6.8 gm/dL / ALK PHOS: 63 U/L / ALT: 13 U/L / AST: 22 U/L / GGT: x             Assessment and Plan:  ESRD; stable.  BP acceptable.  Will follow. Trend Hgb.

## 2018-01-23 NOTE — PROGRESS NOTE ADULT - ASSESSMENT
47f with end stage renal disease with symptomatic anemia. Pt had hysteroscopy with ablation and LEEP last week in 1/17 d/t to a h/o menometrorrhagia. Last cbc was from 1/11 and was 9.2, but pt was bleeding until the procedure. On admission Pt was transfused 3 units and hemoglobin went 5.1 to 7.3 and now reports only minimal bleeding/spotting since the procedure. Discussed the  case with her OBGYN Dr. Ping Delong ([phone]), who performed the procedure. If her hemoglobin remains stable we can discahrge her to follow up with her as an outpatient this week but if drops she will come to see the pt. Currently pt as no signs of active bleeding and reports her nausea has improved since the transfusion

## 2018-01-24 ENCOUNTER — TRANSCRIPTION ENCOUNTER (OUTPATIENT)
Age: 48
End: 2018-01-24

## 2018-01-24 VITALS
OXYGEN SATURATION: 96 % | RESPIRATION RATE: 16 BRPM | HEART RATE: 76 BPM | TEMPERATURE: 98 F | DIASTOLIC BLOOD PRESSURE: 70 MMHG | SYSTOLIC BLOOD PRESSURE: 130 MMHG

## 2018-01-24 DIAGNOSIS — Z94.0 KIDNEY TRANSPLANT STATUS: ICD-10-CM

## 2018-01-24 DIAGNOSIS — I10 ESSENTIAL (PRIMARY) HYPERTENSION: ICD-10-CM

## 2018-01-24 LAB
ANION GAP SERPL CALC-SCNC: 12 MMOL/L — SIGNIFICANT CHANGE UP (ref 5–17)
BUN SERPL-MCNC: 57 MG/DL — HIGH (ref 7–23)
CALCIUM SERPL-MCNC: 8.8 MG/DL — SIGNIFICANT CHANGE UP (ref 8.5–10.1)
CHLORIDE SERPL-SCNC: 98 MMOL/L — SIGNIFICANT CHANGE UP (ref 96–108)
CO2 SERPL-SCNC: 28 MMOL/L — SIGNIFICANT CHANGE UP (ref 22–31)
CREAT SERPL-MCNC: 9.44 MG/DL — HIGH (ref 0.5–1.3)
CULTURE RESULTS: SIGNIFICANT CHANGE UP
GLUCOSE SERPL-MCNC: 93 MG/DL — SIGNIFICANT CHANGE UP (ref 70–99)
HCT VFR BLD CALC: 37.1 % — SIGNIFICANT CHANGE UP (ref 34.5–45)
HGB BLD-MCNC: 12.3 G/DL — SIGNIFICANT CHANGE UP (ref 11.5–15.5)
MCHC RBC-ENTMCNC: 29.9 PG — SIGNIFICANT CHANGE UP (ref 27–34)
MCHC RBC-ENTMCNC: 33.2 GM/DL — SIGNIFICANT CHANGE UP (ref 32–36)
MCV RBC AUTO: 90 FL — SIGNIFICANT CHANGE UP (ref 80–100)
NRBC # BLD: 0 /100 WBCS — SIGNIFICANT CHANGE UP (ref 0–0)
PLATELET # BLD AUTO: 150 K/UL — SIGNIFICANT CHANGE UP (ref 150–400)
POTASSIUM SERPL-MCNC: 4.3 MMOL/L — SIGNIFICANT CHANGE UP (ref 3.5–5.3)
POTASSIUM SERPL-SCNC: 4.3 MMOL/L — SIGNIFICANT CHANGE UP (ref 3.5–5.3)
RBC # BLD: 4.12 M/UL — SIGNIFICANT CHANGE UP (ref 3.8–5.2)
RBC # FLD: 19.7 % — HIGH (ref 10.3–14.5)
SODIUM SERPL-SCNC: 138 MMOL/L — SIGNIFICANT CHANGE UP (ref 135–145)
SPECIMEN SOURCE: SIGNIFICANT CHANGE UP
WBC # BLD: 10.05 K/UL — SIGNIFICANT CHANGE UP (ref 3.8–10.5)
WBC # FLD AUTO: 10.05 K/UL — SIGNIFICANT CHANGE UP (ref 3.8–10.5)

## 2018-01-24 PROCEDURE — 99239 HOSP IP/OBS DSCHRG MGMT >30: CPT

## 2018-01-24 RX ORDER — PANTOPRAZOLE SODIUM 20 MG/1
40 TABLET, DELAYED RELEASE ORAL
Qty: 0 | Refills: 0 | Status: DISCONTINUED | OUTPATIENT
Start: 2018-01-24 | End: 2018-01-24

## 2018-01-24 RX ORDER — PANTOPRAZOLE SODIUM 20 MG/1
1 TABLET, DELAYED RELEASE ORAL
Qty: 15 | Refills: 0 | OUTPATIENT
Start: 2018-01-24 | End: 2018-02-07

## 2018-01-24 RX ADMIN — Medication 90 MILLIGRAM(S): at 06:42

## 2018-01-24 RX ADMIN — SEVELAMER CARBONATE 800 MILLIGRAM(S): 2400 POWDER, FOR SUSPENSION ORAL at 08:49

## 2018-01-24 RX ADMIN — CARVEDILOL PHOSPHATE 25 MILLIGRAM(S): 80 CAPSULE, EXTENDED RELEASE ORAL at 06:42

## 2018-01-24 RX ADMIN — TACROLIMUS 5 MILLIGRAM(S): 5 CAPSULE ORAL at 14:11

## 2018-01-24 RX ADMIN — Medication 100 MILLIGRAM(S): at 15:43

## 2018-01-24 RX ADMIN — Medication 0.2 MILLIGRAM(S): at 06:42

## 2018-01-24 RX ADMIN — SEVELAMER CARBONATE 800 MILLIGRAM(S): 2400 POWDER, FOR SUSPENSION ORAL at 14:11

## 2018-01-24 RX ADMIN — Medication 0.2 MILLIGRAM(S): at 15:43

## 2018-01-24 NOTE — DISCHARGE NOTE ADULT - PROVIDER TOKENS
TOKEN:'[id number]',FREE:[LAST:[Viraj],PHONE:[(   )    -],FAX:[(   )    -],ADDRESS:[Follow with PCP Dr. Esteban Cali within one week.]],TOKEN:'[id number]'

## 2018-01-24 NOTE — PROGRESS NOTE ADULT - SUBJECTIVE AND OBJECTIVE BOX
Patient for HD today. No new events.  Tolerating HD treatments well.  HD prescription reviewed.        01-23-18 @ 07:01  -  01-24-18 @ 07:00  --------------------------------------------------------  IN: 360 mL / OUT: 0 mL / NET: 360 mL    01-24-18 @ 07:01  -  01-24-18 @ 15:59  --------------------------------------------------------  IN: 530 mL / OUT: 1000 mL / NET: -470 mL        PHYSICAL EXAM:      T(C): 37 (01-24-18 @ 14:46), Max: 37.1 (01-23-18 @ 18:19)  HR: 74 (01-24-18 @ 14:46) (67 - 76)  BP: 133/70 (01-24-18 @ 14:46) (126/72 - 143/82)  RR: 16 (01-24-18 @ 14:46) (16 - 18)  SpO2: 97% (01-24-18 @ 14:46) (97% - 98%)  Wt(kg): --  Respiratory: clear anteriorly, decreased BS at bases  Cardiovascular: S1 S2  Gastrointestinal: soft NT ND +BS  Extremities:  1 edema                                          12.3   10.05 )-----------( 150      ( 24 Jan 2018 07:11 )             37.1     01-24    138  |  98  |  57<H>  ----------------------------<  93  4.3   |  28  |  9.44<H>    Ca    8.8      24 Jan 2018 07:11        Maintenance hemodialysis.  PO PPI for dyspepsia.  Blood pressure trends, BFR, AP, , UF goal reviewed.  Clinically stable.

## 2018-01-24 NOTE — DISCHARGE NOTE ADULT - PLAN OF CARE
Follow with Primary Gynecologist tomorrow   Get repeat cbc by PCP within one week continue Hemodialysis x TIW Monday/Wednesday/Friday continue home meds continue home meds. complete recovery

## 2018-01-24 NOTE — DISCHARGE NOTE ADULT - ADDITIONAL INSTRUCTIONS
follow with PCP within one week  Follow with GYN tomorrow as scheduled.  Follow with Dialysis center /nephrologist x 3 times a week to get maintenance dialysis.

## 2018-01-24 NOTE — DISCHARGE NOTE ADULT - MEDICATION SUMMARY - MEDICATIONS TO TAKE
I will START or STAY ON the medications listed below when I get home from the hospital:    clonidine  -- 0.3  by transdermal patch once a week  -- Indication: For Essential hypertension    cloNIDine 0.2 mg oral tablet  -- 1 tab(s) by mouth every 8 hours  -- Indication: For Essential hypertension    carvedilol 25 mg oral tablet  -- 1 tab(s) by mouth every 12 hours  -- Indication: For Essential hypertension    NIFEdipine 90 mg oral tablet, extended release  -- 1 tab(s) by mouth once a day  -- Indication: For Essential hypertension    tacrolimus 5 mg oral capsule  -- 1 cap(s) by mouth every 12 hours  -- Indication: For History of renal transplant    Renvela 800 mg oral tablet  -- 1 tab(s) by mouth 3 times a day (with meals)  -- Indication: For ESRD (end stage renal disease)    hydrALAZINE 100 mg oral tablet  -- 1 tab(s) by mouth every 8 hours  -- Indication: For Essential hypertension I will START or STAY ON the medications listed below when I get home from the hospital:    To whom It may Concern  -- Ms. Ashley is advised rest till 1/28, May return to work on Monday 1/29  -- Indication: For letter    clonidine  -- 0.3  by transdermal patch once a week  -- Indication: For Essential hypertension    cloNIDine 0.2 mg oral tablet  -- 1 tab(s) by mouth every 8 hours  -- Indication: For Essential hypertension    carvedilol 25 mg oral tablet  -- 1 tab(s) by mouth every 12 hours  -- Indication: For Essential hypertension    NIFEdipine 90 mg oral tablet, extended release  -- 1 tab(s) by mouth once a day  -- Indication: For Essential hypertension    tacrolimus 5 mg oral capsule  -- 1 cap(s) by mouth every 12 hours  -- Indication: For History of renal transplant    Renvela 800 mg oral tablet  -- 1 tab(s) by mouth 3 times a day (with meals)  -- Indication: For ESRD (end stage renal disease)    Protonix 40 mg oral delayed release tablet  -- 1 tab(s) by mouth once a day (before a meal)  -- Indication: For NON INTRACTIBLE VOMITTING WITH NAUSEA UNSPECIFIED VOMITING T    hydrALAZINE 100 mg oral tablet  -- 1 tab(s) by mouth every 8 hours  -- Indication: For Essential hypertension

## 2018-01-24 NOTE — DISCHARGE NOTE ADULT - HOSPITAL COURSE
46 yo F with vomiting and decreased PO intake.  Pt. says she had a hysteroscopy done last Wednesday and she hasn't really eaten anything since.  She notes she had a mild allergic reaction 2/2 anesthesia from the procedure on Thursday--she was given prednisone.  On Friday at dialysis she got an iron injection which made her nauseaous.  Two nights ago she vomited.  She just doesn't feel like eating, also felt tired/weak, Pt also with end stage renal disease admitted for symptomatic anemia. Pt had hysteroscopy with ablation and LEEP last week in 1/17 d/t to a h/o menometrorrhagia. Last cbc was from 1/11 and was 9.2, but pt was bleeding until the procedure. On admission Pt was transfused 3 units and hemoglobin went 5.1 to 7.3 and now reports only minimal bleeding/spotting since the procedure. Discussed the  case with her OBGYN Dr. Ping Delong ([phone]), who performed the procedure. If her hemoglobin remains stable we can discharge her to follow up with her as an outpatient this week but if drops she will come to see the pt. Currently pt as no signs of active bleeding and reports her nausea has improved since the transfusion, Pt is getting her maintenance HD and hb improved, stable for dc home today     Problem/Plan - 1:  ·  Problem: Non-intractable vomiting with nausea, unspecified vomiting type.  Plan: improved and  tolerating diet   Tolerating soft diet.      Problem/Plan - 2:  ·  Problem: ESRD (end stage renal disease).  Plan: hemodialysis TIW  Nephro following.      Problem/Plan - 3:  ·  Problem: Anemia due to blood loss.  Plan: s/p 3 PRBC  h/h improved.      Problem/Plan - 4:  ·  Problem: Essential hypertension.  Plan: c/w anti HTNves  Bp stable.      Problem/Plan - 5:  ·  Problem: History of renal transplant.  Plan: c/w tacrolimus.

## 2018-01-24 NOTE — PROGRESS NOTE ADULT - SUBJECTIVE AND OBJECTIVE BOX
CHIEF COMPLAINT/INTERVAL HISTORY:    Patient is a 47y old  Female who presents with a chief complaint of vomiting (2018 11:36)      HPI:  46 yo F with vomiting and decreased PO intake.  Pt. says she had a hysteroscopy done last Wednesday and she hasn't really eaten anything since.  She notes she had a mild allergic reaction 2/2 anesthesia from the procedure on Thursday--she was given prednisone.  On Friday at dialysis she got an iron injection which made her nauseas.  Two nights ago she vomited.  She just doesn't feel like eating.  No other complaints, currently.  	ROS: negative for fever, cough, headache, chest pain, shortness of breath, abd pain, diarrhea, rash, paresthesia, and weakness. (2018 11:36)      SUBJECTIVE & OBJECTIVE: Pt seen and examined at bedside.   Feels better  Denies chest pain/SOB, nausea/vomiting/diarrhea, No cough, dizziness, HA or blurry vision, all other ROS negative.       Vital Signs Last 24 Hrs  T(C): 35.9 (2018 09:40), Max: 37.1 (2018 18:19)  T(F): 96.6 (2018 09:40), Max: 98.7 (2018 18:19)  HR: 67 (2018 09:40) (67 - 70)  BP: 133/81 (2018 09:40) (126/72 - 142/86)  BP(mean): --  ABP: --  ABP(mean): --  RR: 17 (2018 09:40) (17 - 18)  SpO2: 97% (2018 09:40) (97% - 98%)        MEDICATIONS  (STANDING):  carvedilol 25 milliGRAM(s) Oral every 12 hours  chlorhexidine 2% Cloths 1 Application(s) Topical daily  cloNIDine 0.2 milliGRAM(s) Oral every 8 hours  hydrALAZINE 100 milliGRAM(s) Oral every 8 hours  NIFEdipine XL 90 milliGRAM(s) Oral daily  sevelamer hydrochloride 800 milliGRAM(s) Oral three times a day with meals  tacrolimus 5 milliGRAM(s) Oral daily    MEDICATIONS  (PRN):  ondansetron Injectable 4 milliGRAM(s) IV Push every 6 hours PRN Nausea        PHYSICAL EXAM:    GENERAL: NAD,  well-developed  HEAD:  Atraumatic, Normocephalic  EYES: EOMI, PERRLA, conjunctiva and sclera clear  ENMT: Moist mucous membranes  NECK: Supple, No JVD  NERVOUS SYSTEM:  Alert & Oriented X3, Motor Strength 5/5 B/L upper and lower extremities;   CHEST/LUNG: Clear to auscultation bilaterally; No rales, rhonchi, wheezing, or rubs  HEART: Regular rate and rhythm; No murmurs, rubs, or gallops  ABDOMEN: Soft, Nontender, Nondistended; Bowel sounds present  EXTREMITIES:  no cyanosis, or edema    LABS:                        12.3   10.05 )-----------( 150      ( 2018 07:11 )             37.1     -    138  |  98  |  57<H>  ----------------------------<  93  4.3   |  28  |  9.44<H>    Ca    8.8      2018 07:11        Urinalysis Basic - ( 2018 08:18 )    Color: Yellow / Appearance: Slightly Turbid / S.010 / pH: x  Gluc: x / Ketone: Negative  / Bili: Negative / Urobili: Negative mg/dL   Blood: x / Protein: 100 mg/dL / Nitrite: Negative   Leuk Esterase: Moderate / RBC: 6-10 /HPF / WBC >50   Sq Epi: x / Non Sq Epi: Few / Bacteria: Occasional

## 2018-01-24 NOTE — DISCHARGE NOTE ADULT - CARE PLAN
Principal Discharge DX:	Anemia due to blood loss  Goal:	complete recovery  Assessment and plan of treatment:	Follow with Primary Gynecologist tomorrow   Get repeat cbc by PCP within one week  Secondary Diagnosis:	ESRD (end stage renal disease)  Assessment and plan of treatment:	continue Hemodialysis x TIW Monday/Wednesday/Friday  Secondary Diagnosis:	History of renal transplant  Assessment and plan of treatment:	continue home meds  Secondary Diagnosis:	Essential hypertension  Assessment and plan of treatment:	continue home meds.

## 2018-01-24 NOTE — PROGRESS NOTE ADULT - ASSESSMENT
48 y/o f with end stage renal disease p/with symptomatic anemia. Pt had hysteroscopy with ablation and LEEP last week in 1/17 d/t to a h/o menometrorrhagia. Last cbc was from 1/11 and was 9.2, but pt was bleeding until the procedure. On admission Pt was transfused 3 units and hemoglobin went 5.1 to 7.3 and now reports only minimal bleeding/spotting since the procedure. Discussed the  case with her OBGYN Dr. Ping Delong ([phone]), who performed the procedure. If her hemoglobin remains stable we can discharge her to follow up with her as an outpatient this week but if drops she will come to see the pt. Currently pt as no signs of active bleeding and reports her nausea has improved since the transfusion, Pt is getting her maintenance HD

## 2018-01-24 NOTE — DISCHARGE NOTE ADULT - CARE PROVIDER_API CALL
Seth Mary), Internal Medicine; Nephrology  [address] [facility]  [address] [place] [address] [id number]  Phone: [phone]  Fax: [phone]    Viraj,   Follow with PCP Dr. Esteban Cali within one week.  Phone: (   )    -  Fax: (   )    -    Ping Delong), Obstetrics and Gynecology  [address] [facility]  [address] [place] [id number]  Phone: [phone]  Fax: [phone]

## 2018-01-24 NOTE — DISCHARGE NOTE ADULT - PATIENT PORTAL LINK FT
“You can access the [facility] Patient Portal, offered by [facility], by registering with the following website: [email or web address]”

## 2018-01-26 DIAGNOSIS — Z79.899 OTHER LONG TERM (CURRENT) DRUG THERAPY: ICD-10-CM

## 2018-01-26 DIAGNOSIS — Z99.2 DEPENDENCE ON RENAL DIALYSIS: ICD-10-CM

## 2018-01-26 DIAGNOSIS — R10.13 EPIGASTRIC PAIN: ICD-10-CM

## 2018-01-26 DIAGNOSIS — Z88.5 ALLERGY STATUS TO NARCOTIC AGENT: ICD-10-CM

## 2018-01-26 DIAGNOSIS — I12.0 HYPERTENSIVE CHRONIC KIDNEY DISEASE WITH STAGE 5 CHRONIC KIDNEY DISEASE OR END STAGE RENAL DISEASE: ICD-10-CM

## 2018-01-26 DIAGNOSIS — Z94.0 KIDNEY TRANSPLANT STATUS: ICD-10-CM

## 2018-01-26 DIAGNOSIS — D63.1 ANEMIA IN CHRONIC KIDNEY DISEASE: ICD-10-CM

## 2018-01-26 DIAGNOSIS — Z88.4 ALLERGY STATUS TO ANESTHETIC AGENT: ICD-10-CM

## 2018-01-26 DIAGNOSIS — R11.2 NAUSEA WITH VOMITING, UNSPECIFIED: ICD-10-CM

## 2018-01-26 DIAGNOSIS — D50.0 IRON DEFICIENCY ANEMIA SECONDARY TO BLOOD LOSS (CHRONIC): ICD-10-CM

## 2018-01-26 DIAGNOSIS — N18.6 END STAGE RENAL DISEASE: ICD-10-CM

## 2018-01-26 DIAGNOSIS — Z28.09 IMMUNIZATION NOT CARRIED OUT BECAUSE OF OTHER CONTRAINDICATION: ICD-10-CM

## 2018-11-20 NOTE — PHARMACY COMMUNICATION NOTE - PROVIDER CONTACTED
Dr. Mcallister Results for orders placed or performed in visit on 11/16/18   Cardiac EP device report    Narrative    DEVICE INTERROGATED IN THE [place] OFFICE  BATTERY VOLTAGE ADEQUATE  (12 9 YRS)  AP 60%  1%  ALL LEAD PARAMETERS WITHIN NORMAL LIMITS  1 VHR EPISODE DETECTED 10 BEATS @ 165 BPM  PATIENT IS ON ELIQUIS AND EF IS 50% (11/2018)  NORMAL DEVICE FUNCTION  WOUND CHECK: INCISION CLEAN AND DRY WITH EDGES APPROXIMATED; WOUND CARE AND RESTRICTIONS REVIEWED WITH PATIENT  ---GOLDBERG

## 2019-01-01 NOTE — H&P ADULT - PMH
Cerebral edema    Dialysis patient    ESRD (end stage renal disease)    History of renal transplant    HTN - Hypertension
A positive

## 2019-02-12 ENCOUNTER — TRANSCRIPTION ENCOUNTER (OUTPATIENT)
Age: 49
End: 2019-02-12

## 2019-02-13 ENCOUNTER — INPATIENT (INPATIENT)
Facility: HOSPITAL | Age: 49
LOS: 1 days | Discharge: HOME | End: 2019-02-15
Attending: SURGERY | Admitting: SURGERY
Payer: COMMERCIAL

## 2019-02-13 ENCOUNTER — RESULT REVIEW (OUTPATIENT)
Age: 49
End: 2019-02-13

## 2019-02-13 VITALS
HEIGHT: 67 IN | DIASTOLIC BLOOD PRESSURE: 106 MMHG | RESPIRATION RATE: 20 BRPM | SYSTOLIC BLOOD PRESSURE: 175 MMHG | WEIGHT: 171.96 LBS | OXYGEN SATURATION: 100 % | HEART RATE: 72 BPM | TEMPERATURE: 98 F

## 2019-02-13 DIAGNOSIS — K55.069 ACUTE INFARCTION OF INTESTINE, PART AND EXTENT UNSPECIFIED: ICD-10-CM

## 2019-02-13 DIAGNOSIS — Z99.2 DEPENDENCE ON RENAL DIALYSIS: Chronic | ICD-10-CM

## 2019-02-13 DIAGNOSIS — Z98.890 OTHER SPECIFIED POSTPROCEDURAL STATES: Chronic | ICD-10-CM

## 2019-02-13 LAB
ALBUMIN SERPL ELPH-MCNC: 4 G/DL — SIGNIFICANT CHANGE UP (ref 3.3–5)
ALP SERPL-CCNC: 89 U/L — SIGNIFICANT CHANGE UP (ref 40–120)
ALT FLD-CCNC: 17 U/L — SIGNIFICANT CHANGE UP (ref 12–78)
ANION GAP SERPL CALC-SCNC: 18 MMOL/L — HIGH (ref 5–17)
APTT BLD: 37.6 SEC — HIGH (ref 28.5–37)
AST SERPL-CCNC: 15 U/L — SIGNIFICANT CHANGE UP (ref 15–37)
BASOPHILS # BLD AUTO: 0.04 K/UL — SIGNIFICANT CHANGE UP (ref 0–0.2)
BASOPHILS NFR BLD AUTO: 0.4 % — SIGNIFICANT CHANGE UP (ref 0–2)
BILIRUB SERPL-MCNC: 0.3 MG/DL — SIGNIFICANT CHANGE UP (ref 0.2–1.2)
BUN SERPL-MCNC: 65 MG/DL — HIGH (ref 7–23)
CALCIUM SERPL-MCNC: 10.3 MG/DL — HIGH (ref 8.5–10.1)
CHLORIDE SERPL-SCNC: 98 MMOL/L — SIGNIFICANT CHANGE UP (ref 96–108)
CO2 SERPL-SCNC: 26 MMOL/L — SIGNIFICANT CHANGE UP (ref 22–31)
CREAT SERPL-MCNC: 13 MG/DL — HIGH (ref 0.5–1.3)
EOSINOPHIL # BLD AUTO: 0.7 K/UL — HIGH (ref 0–0.5)
EOSINOPHIL NFR BLD AUTO: 6.4 % — HIGH (ref 0–6)
GLUCOSE SERPL-MCNC: 118 MG/DL — HIGH (ref 70–99)
HCT VFR BLD CALC: 37 % — SIGNIFICANT CHANGE UP (ref 34.5–45)
HGB BLD-MCNC: 11.5 G/DL — SIGNIFICANT CHANGE UP (ref 11.5–15.5)
IMM GRANULOCYTES NFR BLD AUTO: 0.5 % — SIGNIFICANT CHANGE UP (ref 0–1.5)
INR BLD: 1.02 RATIO — SIGNIFICANT CHANGE UP (ref 0.88–1.16)
LACTATE SERPL-SCNC: 1.1 MMOL/L — SIGNIFICANT CHANGE UP (ref 0.7–2)
LIDOCAIN IGE QN: 150 U/L — SIGNIFICANT CHANGE UP (ref 73–393)
LYMPHOCYTES # BLD AUTO: 1.66 K/UL — SIGNIFICANT CHANGE UP (ref 1–3.3)
LYMPHOCYTES # BLD AUTO: 15.2 % — SIGNIFICANT CHANGE UP (ref 13–44)
MCHC RBC-ENTMCNC: 30 PG — SIGNIFICANT CHANGE UP (ref 27–34)
MCHC RBC-ENTMCNC: 31.1 GM/DL — LOW (ref 32–36)
MCV RBC AUTO: 96.6 FL — SIGNIFICANT CHANGE UP (ref 80–100)
MONOCYTES # BLD AUTO: 0.56 K/UL — SIGNIFICANT CHANGE UP (ref 0–0.9)
MONOCYTES NFR BLD AUTO: 5.1 % — SIGNIFICANT CHANGE UP (ref 2–14)
NEUTROPHILS # BLD AUTO: 7.92 K/UL — HIGH (ref 1.8–7.4)
NEUTROPHILS NFR BLD AUTO: 72.4 % — SIGNIFICANT CHANGE UP (ref 43–77)
NRBC # BLD: 0 /100 WBCS — SIGNIFICANT CHANGE UP (ref 0–0)
PLATELET # BLD AUTO: 207 K/UL — SIGNIFICANT CHANGE UP (ref 150–400)
POTASSIUM SERPL-MCNC: 5.1 MMOL/L — SIGNIFICANT CHANGE UP (ref 3.5–5.3)
POTASSIUM SERPL-SCNC: 5.1 MMOL/L — SIGNIFICANT CHANGE UP (ref 3.5–5.3)
PROT SERPL-MCNC: 9 GM/DL — HIGH (ref 6–8.3)
PROTHROM AB SERPL-ACNC: 11.4 SEC — SIGNIFICANT CHANGE UP (ref 10–12.9)
RBC # BLD: 3.83 M/UL — SIGNIFICANT CHANGE UP (ref 3.8–5.2)
RBC # FLD: 14.5 % — SIGNIFICANT CHANGE UP (ref 10.3–14.5)
SODIUM SERPL-SCNC: 142 MMOL/L — SIGNIFICANT CHANGE UP (ref 135–145)
TROPONIN I SERPL-MCNC: <.015 NG/ML — SIGNIFICANT CHANGE UP (ref 0.01–0.04)
WBC # BLD: 10.94 K/UL — HIGH (ref 3.8–10.5)
WBC # FLD AUTO: 10.94 K/UL — HIGH (ref 3.8–10.5)

## 2019-02-13 PROCEDURE — 99285 EMERGENCY DEPT VISIT HI MDM: CPT | Mod: 25

## 2019-02-13 PROCEDURE — 74176 CT ABD & PELVIS W/O CONTRAST: CPT | Mod: 26

## 2019-02-13 PROCEDURE — 49561: CPT

## 2019-02-13 PROCEDURE — 88302 TISSUE EXAM BY PATHOLOGIST: CPT | Mod: 26

## 2019-02-13 PROCEDURE — 99223 1ST HOSP IP/OBS HIGH 75: CPT

## 2019-02-13 PROCEDURE — 49568: CPT

## 2019-02-13 RX ORDER — BENZOCAINE AND MENTHOL 5; 1 G/100ML; G/100ML
1 LIQUID ORAL
Qty: 0 | Refills: 0 | Status: DISCONTINUED | OUTPATIENT
Start: 2019-02-13 | End: 2019-02-15

## 2019-02-13 RX ORDER — NIFEDIPINE 30 MG
90 TABLET, EXTENDED RELEASE 24 HR ORAL DAILY
Qty: 0 | Refills: 0 | Status: DISCONTINUED | OUTPATIENT
Start: 2019-02-13 | End: 2019-02-15

## 2019-02-13 RX ORDER — MORPHINE SULFATE 50 MG/1
2 CAPSULE, EXTENDED RELEASE ORAL ONCE
Qty: 0 | Refills: 0 | Status: DISCONTINUED | OUTPATIENT
Start: 2019-02-13 | End: 2019-02-13

## 2019-02-13 RX ORDER — SODIUM CHLORIDE 9 MG/ML
1000 INJECTION, SOLUTION INTRAVENOUS
Qty: 0 | Refills: 0 | Status: DISCONTINUED | OUTPATIENT
Start: 2019-02-13 | End: 2019-02-13

## 2019-02-13 RX ORDER — ACETAMINOPHEN 500 MG
650 TABLET ORAL ONCE
Qty: 0 | Refills: 0 | Status: DISCONTINUED | OUTPATIENT
Start: 2019-02-13 | End: 2019-02-13

## 2019-02-13 RX ORDER — SEVELAMER CARBONATE 2400 MG/1
800 POWDER, FOR SUSPENSION ORAL THREE TIMES A DAY
Qty: 0 | Refills: 0 | Status: DISCONTINUED | OUTPATIENT
Start: 2019-02-13 | End: 2019-02-15

## 2019-02-13 RX ORDER — CARVEDILOL PHOSPHATE 80 MG/1
25 CAPSULE, EXTENDED RELEASE ORAL EVERY 12 HOURS
Qty: 0 | Refills: 0 | Status: DISCONTINUED | OUTPATIENT
Start: 2019-02-13 | End: 2019-02-15

## 2019-02-13 RX ORDER — HYDRALAZINE HCL 50 MG
1 TABLET ORAL
Qty: 0 | Refills: 0 | COMMUNITY

## 2019-02-13 RX ORDER — ONDANSETRON 8 MG/1
4 TABLET, FILM COATED ORAL ONCE
Qty: 0 | Refills: 0 | Status: COMPLETED | OUTPATIENT
Start: 2019-02-13 | End: 2019-02-13

## 2019-02-13 RX ORDER — METOPROLOL TARTRATE 50 MG
5 TABLET ORAL EVERY 4 HOURS
Qty: 0 | Refills: 0 | Status: DISCONTINUED | OUTPATIENT
Start: 2019-02-13 | End: 2019-02-13

## 2019-02-13 RX ORDER — HEPARIN SODIUM 5000 [USP'U]/ML
5000 INJECTION INTRAVENOUS; SUBCUTANEOUS EVERY 12 HOURS
Qty: 0 | Refills: 0 | Status: DISCONTINUED | OUTPATIENT
Start: 2019-02-13 | End: 2019-02-15

## 2019-02-13 RX ORDER — ACETAMINOPHEN 500 MG
1000 TABLET ORAL ONCE
Qty: 0 | Refills: 0 | Status: COMPLETED | OUTPATIENT
Start: 2019-02-13 | End: 2019-02-13

## 2019-02-13 RX ORDER — SODIUM CHLORIDE 9 MG/ML
3 INJECTION INTRAMUSCULAR; INTRAVENOUS; SUBCUTANEOUS ONCE
Qty: 0 | Refills: 0 | Status: COMPLETED | OUTPATIENT
Start: 2019-02-13 | End: 2019-02-13

## 2019-02-13 RX ORDER — FENTANYL CITRATE 50 UG/ML
25 INJECTION INTRAVENOUS
Qty: 0 | Refills: 0 | Status: DISCONTINUED | OUTPATIENT
Start: 2019-02-13 | End: 2019-02-13

## 2019-02-13 RX ORDER — MORPHINE SULFATE 50 MG/1
4 CAPSULE, EXTENDED RELEASE ORAL ONCE
Qty: 0 | Refills: 0 | Status: DISCONTINUED | OUTPATIENT
Start: 2019-02-13 | End: 2019-02-13

## 2019-02-13 RX ORDER — HYDRALAZINE HCL 50 MG
10 TABLET ORAL EVERY 4 HOURS
Qty: 0 | Refills: 0 | Status: DISCONTINUED | OUTPATIENT
Start: 2019-02-13 | End: 2019-02-13

## 2019-02-13 RX ORDER — PANTOPRAZOLE SODIUM 20 MG/1
40 TABLET, DELAYED RELEASE ORAL ONCE
Qty: 0 | Refills: 0 | Status: COMPLETED | OUTPATIENT
Start: 2019-02-13 | End: 2019-02-13

## 2019-02-13 RX ORDER — CEFAZOLIN SODIUM 1 G
2000 VIAL (EA) INJECTION EVERY 8 HOURS
Qty: 0 | Refills: 0 | Status: COMPLETED | OUTPATIENT
Start: 2019-02-13 | End: 2019-02-14

## 2019-02-13 RX ORDER — LABETALOL HCL 100 MG
10 TABLET ORAL EVERY 4 HOURS
Qty: 0 | Refills: 0 | Status: DISCONTINUED | OUTPATIENT
Start: 2019-02-13 | End: 2019-02-13

## 2019-02-13 RX ORDER — HEPARIN SODIUM 5000 [USP'U]/ML
5000 INJECTION INTRAVENOUS; SUBCUTANEOUS EVERY 12 HOURS
Qty: 0 | Refills: 0 | Status: DISCONTINUED | OUTPATIENT
Start: 2019-02-13 | End: 2019-02-13

## 2019-02-13 RX ORDER — NIFEDIPINE 30 MG
90 TABLET, EXTENDED RELEASE 24 HR ORAL ONCE
Qty: 0 | Refills: 0 | Status: COMPLETED | OUTPATIENT
Start: 2019-02-13 | End: 2019-02-13

## 2019-02-13 RX ORDER — TACROLIMUS 5 MG/1
5 CAPSULE ORAL EVERY 12 HOURS
Qty: 0 | Refills: 0 | Status: DISCONTINUED | OUTPATIENT
Start: 2019-02-13 | End: 2019-02-15

## 2019-02-13 RX ORDER — CARVEDILOL PHOSPHATE 80 MG/1
25 CAPSULE, EXTENDED RELEASE ORAL ONCE
Qty: 0 | Refills: 0 | Status: COMPLETED | OUTPATIENT
Start: 2019-02-13 | End: 2019-02-13

## 2019-02-13 RX ORDER — SODIUM CHLORIDE 9 MG/ML
1000 INJECTION INTRAMUSCULAR; INTRAVENOUS; SUBCUTANEOUS ONCE
Qty: 0 | Refills: 0 | Status: COMPLETED | OUTPATIENT
Start: 2019-02-13 | End: 2019-02-13

## 2019-02-13 RX ORDER — IOHEXOL 300 MG/ML
30 INJECTION, SOLUTION INTRAVENOUS ONCE
Qty: 0 | Refills: 0 | Status: COMPLETED | OUTPATIENT
Start: 2019-02-13 | End: 2019-02-13

## 2019-02-13 RX ADMIN — Medication 10 MILLIGRAM(S): at 11:16

## 2019-02-13 RX ADMIN — PANTOPRAZOLE SODIUM 40 MILLIGRAM(S): 20 TABLET, DELAYED RELEASE ORAL at 05:38

## 2019-02-13 RX ADMIN — MORPHINE SULFATE 4 MILLIGRAM(S): 50 CAPSULE, EXTENDED RELEASE ORAL at 09:15

## 2019-02-13 RX ADMIN — Medication 90 MILLIGRAM(S): at 17:58

## 2019-02-13 RX ADMIN — Medication 400 MILLIGRAM(S): at 05:38

## 2019-02-13 RX ADMIN — Medication 1000 MILLIGRAM(S): at 06:00

## 2019-02-13 RX ADMIN — MORPHINE SULFATE 4 MILLIGRAM(S): 50 CAPSULE, EXTENDED RELEASE ORAL at 08:44

## 2019-02-13 RX ADMIN — MORPHINE SULFATE 2 MILLIGRAM(S): 50 CAPSULE, EXTENDED RELEASE ORAL at 21:39

## 2019-02-13 RX ADMIN — MORPHINE SULFATE 2 MILLIGRAM(S): 50 CAPSULE, EXTENDED RELEASE ORAL at 22:09

## 2019-02-13 RX ADMIN — MORPHINE SULFATE 4 MILLIGRAM(S): 50 CAPSULE, EXTENDED RELEASE ORAL at 11:29

## 2019-02-13 RX ADMIN — TACROLIMUS 5 MILLIGRAM(S): 5 CAPSULE ORAL at 17:58

## 2019-02-13 RX ADMIN — SODIUM CHLORIDE 75 MILLILITER(S): 9 INJECTION, SOLUTION INTRAVENOUS at 11:17

## 2019-02-13 RX ADMIN — Medication 0.3 MILLIGRAM(S): at 21:41

## 2019-02-13 RX ADMIN — Medication 100 MILLIGRAM(S): at 21:39

## 2019-02-13 RX ADMIN — ONDANSETRON 4 MILLIGRAM(S): 8 TABLET, FILM COATED ORAL at 08:43

## 2019-02-13 RX ADMIN — SODIUM CHLORIDE 1000 MILLILITER(S): 9 INJECTION INTRAMUSCULAR; INTRAVENOUS; SUBCUTANEOUS at 05:38

## 2019-02-13 RX ADMIN — ONDANSETRON 4 MILLIGRAM(S): 8 TABLET, FILM COATED ORAL at 05:38

## 2019-02-13 RX ADMIN — IOHEXOL 30 MILLILITER(S): 300 INJECTION, SOLUTION INTRAVENOUS at 05:38

## 2019-02-13 RX ADMIN — CARVEDILOL PHOSPHATE 25 MILLIGRAM(S): 80 CAPSULE, EXTENDED RELEASE ORAL at 17:58

## 2019-02-13 RX ADMIN — SODIUM CHLORIDE 3 MILLILITER(S): 9 INJECTION INTRAMUSCULAR; INTRAVENOUS; SUBCUTANEOUS at 05:39

## 2019-02-13 NOTE — BRIEF OPERATIVE NOTE - PRE-OP DX
Incisional hernia with obstruction but no gangrene  02/13/2019    Rg Gupta  Small bowel obstruction  02/13/2019    Rg Gupta

## 2019-02-13 NOTE — CONSULT NOTE ADULT - SUBJECTIVE AND OBJECTIVE BOX
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  Patient with known abdominal hernia and being prepared for elective surgery now with severe sharp pain 10/10 since this morning.  Unable to tolerate oral meds.  Reports bowel movement this morning.  Has history of difficult to control hypertension and BPs 200s/110s.  Has ESRD on hemodialysis MWF alternating with MF weekly with failed renal transplant last year.  Reports having echo and stress last year prior to renal transplant.  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     REVIEW OF SYSTEMS:   Denied fever or chills or acute fatigue recently.  Denied acute SOB or cough.  Denied chest pain or palpitations.  Denied acute neurological changes/deficits.  Denied acute joint pain.  Denied acute depressive or other psych issues.    MEDICATIONS  (STANDING):  cloNIDine Patch 0.3 mG/24Hr(s) 1 patch Transdermal every 7 days  dextrose 5% + sodium chloride 0.9%. 1000 milliLiter(s) (75 mL/Hr) IV Continuous <Continuous>  heparin  Injectable 5000 Unit(s) SubCutaneous every 12 hours  hydrALAZINE Injectable 10 milliGRAM(s) IV Push every 4 hours  metoprolol tartrate IVPB 5 milliGRAM(s) IV Intermittent every 4 hours  morphine  - Injectable 4 milliGRAM(s) IV Push once    MEDICATIONS  (PRN):    Allergies    Dilaudid (Flushing (Severe); Short breath (Mild to Mod); Faint (Mod to Severe))    Intolerances    PHYSICAL EXAM:  T(C): 36.7 (02-13-19 @ 10:23), Max: 36.9 (02-13-19 @ 09:00)  HR: 73 (02-13-19 @ 10:23) (67 - 73)  BP: 186/106 (02-13-19 @ 10:23) (175/106 - 201/120)  RR: 18 (02-13-19 @ 10:23) (18 - 20)  SpO2: 100% (02-13-19 @ 10:23) (100% - 100%)    GENERAL:  Awake, appears in uncomfortable and in pain.  HEENT:  PERRL, EOMI, anicteric sclerae, normal appearing oral mucosa, dentition okay.    NECK:  Supple, no JVD.  Thyroid not palpable.    LUNGS:  Clear to auscultation, no wheezes, rhonchi, or crackles.  Respiratory effort does not appear labored.  CVS:  RRR, S1 and S2.  No obvious rubs or murmurs.  No peripheral edema.  ABDOMEN:  Soft, ventral hernia with tenderness.  No masses or HSM noted.  Bowel sounds not heard.  MUSCULOSKELETAL:  Moves all extremities, strength 4/5.  No obvious deformities.    NEURO:  Cranial nerves grossly intact.  No focal sensorimotor deficits.  DTR 2+.  SKIN:  No obvious rash.  Turgor okay.  Tunneled dialysis catheter on right chest wall.  PSYCHIATRIC:  Mood and affect appear appropriate to situation.    LYMPH NODES:  No cervical or supraclavicular nodes palpable.    LABS:                        11.5   10.94 )-----------( 207      ( 13 Feb 2019 05:40 )             37.0     02-13    142  |  98  |  65<H>  ----------------------------<  118<H>  5.1   |  26  |  13.00<H>    Ca    10.3<H>      13 Feb 2019 05:40    TPro  9.0<H>  /  Alb  4.0  /  TBili  0.3  /  DBili  x   /  AST  15  /  ALT  17  /  AlkPhos  89  02-13    PT/INR - ( 13 Feb 2019 05:40 )   PT: 11.4 sec;   INR: 1.02 ratio         PTT - ( 13 Feb 2019 05:40 )  PTT:37.6 sec    CAPILLARY BLOOD GLUCOSE      EKG - sinus rhythm, LVH, inverted T waves.    IMPRESSION:     Small bowel obstruction with transition at an infraumbilical ventral   abdominal hernia.

## 2019-02-13 NOTE — ED PROVIDER NOTE - OBJECTIVE STATEMENT
Pertinent PMH/PSH/FHx/SHx and Review of Systems contained within:  47 yo f with pmh of kidney resection and esrd on HD, htn presents in ED c/o abdominal pain associated with nbnb vomitus today. patient reports normal BMs within last 45 minutes. No aggravating or relieving factors, No fever/chills, No photophobia/eye pain/changes in vision, No ear pain/sore throat/dysphagia, No chest pain/palpitations, no SOB/cough/wheeze/stridor, No D, no dysuria/frequency/discharge, No neck/back pain, no rash, no changes in neurological status/function.

## 2019-02-13 NOTE — H&P ADULT - NSHPPHYSICALEXAM_GEN_ALL_CORE
· CONSTITUTIONAL: Well appearing, well nourished, awake, alert, oriented to person, place, time/situation and in painful distress s/p morphine administration  · ENMT: Airway patent, Nasal mucosa clear. Mouth with normal mucosa. Throat has no vesicles, no oropharyngeal exudates and uvula is midline.  · EYES: Clear bilaterally, pupils equal, round and reactive to light.  · CARDIAC: Normal rate, regular rhythm.  Heart sounds S1, S2.  No murmurs, rubs or gallops.  · RESPIRATORY: Breath sounds clear and equal bilaterally.  · GASTROINTESTINAL: ND, suprapubic incision well healed, +large ventral hernia, hyperactive BS in lower quadrants, incarcerated/ non reducible ventral hernia. +TTP   · MUSCULOSKELETAL: Spine appears normal, range of motion is not limited, no muscle or joint tenderness  · NEUROLOGICAL: Alert and oriented, no focal deficits, no motor or sensory deficits.  · SKIN: Skin normal color for race, warm, dry and intact. No evidence of rash.  · PSYCHIATRIC: Alert and oriented to person, place, time/situation. normal mood and affect. no apparent risk to self or others.  · HEME LYMPH: No adenopathy or splenomegaly. No cervical or inguinal lymphadenopathy.

## 2019-02-13 NOTE — BRIEF OPERATIVE NOTE - PROCEDURE
<<-----Click on this checkbox to enter Procedure Incisional hernia repair with mesh  02/13/2019    Active  NIDIA

## 2019-02-13 NOTE — CONSULT NOTE ADULT - REASON FOR ADMISSION
Abdominal pain found to have SBO secondary to incarcerated ventral/ incisional hernia

## 2019-02-13 NOTE — ED PROVIDER NOTE - CLINICAL SUMMARY MEDICAL DECISION MAKING FREE TEXT BOX
labs, ivfs, gi cocktail and iv tylenol. pending CT and re-eval. Patient care transitioned to incoming team.  All decisions regarding the progression of care will be made at their discretion.

## 2019-02-13 NOTE — H&P ADULT - HISTORY OF PRESENT ILLNESS
47y/o F with PMH of Kidney transplant that failed 2yrs ago currently on HD( MWF), HTN, PSH of incisional hernia repair in 2017 presents in ED c/o abdominal pain since last night. Pt states that pain started~3wks ago, was episodic and would resolve with warm beverages, however pt states last night pain was severe and tea didnt help. Pain is a 10/10 and in located at hernia site. Pt states that she noticed hernia a couple months ago and bulge has gotten progressively larger, she went to see Dr. Mcadams for elective hernia repair and was in the process of being medically optimized for surgery. Presently pain is associated with nonbilious emesis and nausea, patient reports normal BM, last Bm was at 4am, denies flatus since.Denies fever/chills, No photophobia/eye pain/changes in vision, No ear pain/sore throat/dysphagia, No chest pain/palpitations, no SOB/cough/wheeze/stridor, No D, no dysuria/frequency/discharge, No neck/back pain, no rash, no changes in neurological status/function.

## 2019-02-13 NOTE — CONSULT NOTE ADULT - SUBJECTIVE AND OBJECTIVE BOX
CARDIOLOGY CONSULT NOTE    Patient is a 48y Female with a known history of :    HPI:  47y/o F with PMH of Kidney transplant that failed 2yrs ago currently on HD( MWF), HTN, PSH of incisional hernia repair in 2017 presents in ED c/o abdominal pain since last night. Pt states that pain started~3wks ago, was episodic and would resolve with warm beverages, however pt states last night pain was severe and tea didnt help. Pain is a 10/10 and in located at hernia site. Pt states that she noticed hernia a couple months ago and bulge has gotten progressively larger, she went to see Dr. Mcadams for elective hernia repair and was in the process of being medically optimized for surgery. Presently pain is associated with nonbilious emesis and nausea, patient reports normal BM, last Bm was at 4am, denies flatus.  Currently being prepped for surgery for SBO.  Unable to tolerate oral meds today. Has history of difficult to control hypertension and BPs 200s/110s.    Reports having echo and stress last year prior to renal transplant.  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   Tessie neg x 1  ECG: sinus calista 58bpm; LVH; lateral TWIs      REVIEW OF SYSTEMS:    CONSTITUTIONAL: No fever, weight loss, or fatigue  EYES: No eye pain, visual disturbances, or discharge  ENMT:  No difficulty hearing, tinnitus, vertigo; No sinus or throat pain  NECK: No pain or stiffness  BREASTS: No pain, masses, or nipple discharge  RESPIRATORY: No cough, wheezing, chills or hemoptysis; No shortness of breath  CARDIOVASCULAR: No chest pain, palpitations, dizziness, or leg swelling  GASTROINTESTINAL: + abdominal or epigastric pain  GENITOURINARY: No dysuria, frequency, hematuria, or incontinence  NEUROLOGICAL: No headaches, memory loss, loss of strength, numbness, or tremors  SKIN: No itching, burning, rashes, or lesions   LYMPH NODES: No enlarged glands  ENDOCRINE: No heat or cold intolerance; No hair loss  MUSCULOSKELETAL: No joint pain or swelling; No muscle, back, or extremity pain  PSYCHIATRIC: No depression, anxiety, mood swings, or difficulty sleeping  HEME/LYMPH: No easy bruising, or bleeding gums  ALLERGY AND IMMUNOLOGIC: No hives or eczema    MEDICATIONS  (STANDING):  carvedilol 25 milliGRAM(s) Oral every 12 hours  ceFAZolin   IVPB 2000 milliGRAM(s) IV Intermittent every 8 hours  cloNIDine 0.3 milliGRAM(s) Oral three times a day  sevelamer hydrochloride 800 milliGRAM(s) Oral three times a day    MEDICATIONS  (PRN):  benzocaine 15 mG/menthol 3.6 mG Lozenge 1 Lozenge Oral every 2 hours PRN Sore Throat  fentaNYL    Injectable 25 MICROGram(s) IV Push every 5 minutes PRN Moderate Pain (4 - 6)      ALLERGIES: Dilaudid (Flushing (Severe); Short breath (Mild to Mod); Faint (Mod to Severe))      FAMILY HISTORY:      PHYSICAL EXAMINATION:  -----------------------------  T(C): 36.5 (02-13-19 @ 13:35), Max: 36.9 (02-13-19 @ 09:00)  HR: 81 (02-13-19 @ 14:15) (67 - 86)  BP: 148/81 (02-13-19 @ 14:15) (132/84 - 201/120)  RR: 16 (02-13-19 @ 14:15) (13 - 23)  SpO2: 97% (02-13-19 @ 14:15) (96% - 100%)  Wt(kg): --    Height (cm): 170.18 (02-13 @ 05:00)  Weight (kg): 78 (02-13 @ 05:00)  BMI (kg/m2): 26.9 (02-13 @ 05:00)  BSA (m2): 1.9 (02-13 @ 05:00)    Constitutional: + distress.   Eyes: the conjunctiva exhibited no abnormalities and the eyelids demonstrated no xanthelasmas.   HEENT: normal oral mucosa, no oral pallor and no oral cyanosis.   Neck: normal jugular venous A waves present, normal jugular venous V waves present and no jugular venous johnson A waves.   Pulmonary: no respiratory distress, normal respiratory rhythm and effort, no accessory muscle use and lungs were clear to auscultation bilaterally.   Cardiovascular: heart rate and rhythm were normal, normal S1 and S2 and no murmur, gallop, rub, heave or thrill are present.   Abdomen: severe abd pain  Musculoskeletal: the gait could not be assessed..   Extremities: no clubbing of the fingernails, no localized cyanosis, no petechial hemorrhages and no ischemic changes.   Skin: normal skin color and pigmentation, no rash, no venous stasis, no skin lesions, no skin ulcer and no xanthoma was observed.   Psychiatric: oriented to person, place, and time, the affect was normal, the mood was normal and not feeling anxious.     LABS:   --------  02-13    142  |  98  |  65<H>  ----------------------------<  118<H>  5.1   |  26  |  13.00<H>    Ca    10.3<H>      13 Feb 2019 05:40    TPro  9.0<H>  /  Alb  4.0  /  TBili  0.3  /  DBili  x   /  AST  15  /  ALT  17  /  AlkPhos  89  02-13                         11.5   10.94 )-----------( 207      ( 13 Feb 2019 05:40 )             37.0     PT/INR - ( 13 Feb 2019 05:40 )   PT: 11.4 sec;   INR: 1.02 ratio         PTT - ( 13 Feb 2019 05:40 )  PTT:37.6 sec    02-13 @ 05:40 CPK total:--, CKMB --, Troponin I - <.015 ng/mL          RADIOLOGY:  -----------------    ECG: sinus calista 58bpm; LVH; lateral TWIs

## 2019-02-13 NOTE — H&P ADULT - ASSESSMENT
47 y/o Female with PMH of Kidney transplant that failed 2yrs ago currently on HD( MWF), HTN, PSH of incisional hernia repair, now with SBO with transition point within ventral hernia.  -Admit to Dr. Mcadams  -NPO/NGT/IVF  - pain management  -Medical and Renal Consult  -OR for Exploratory Lap

## 2019-02-13 NOTE — ED ADULT TRIAGE NOTE - CHIEF COMPLAINT QUOTE
pt c/o upper abd pain, acute onset tonight with vomiting x 1, denies diarrhea,  pt has hx of abd hernia and is to have surgery by [facility] within the next 2 weeks.  (teso- R-chest wall, diaysis M-W-F,  R-Kidney transplant)

## 2019-02-13 NOTE — CONSULT NOTE ADULT - SUBJECTIVE AND OBJECTIVE BOX
Information from chart:  "Patient is a 48y old  Female who presents with a chief complaint of Abdominal pain found to have SBO secondary to incarcerated ventral/ incisional hernia (13 Feb 2019 14:29)    HPI:  49y/o F with PMH of Kidney transplant that failed 2yrs ago currently on HD( MWF), HTN, PSH of incisional hernia repair in 2017 presents in ED c/o abdominal pain since last night. Pt states that pain started~3wks ago, was episodic and would resolve with warm beverages, however pt states last night pain was severe and tea didnt help. Pain is a 10/10 and in located at hernia site. Pt states that she noticed hernia a couple months ago and bulge has gotten progressively larger, she went to see Dr. Mcadams for elective hernia repair and was in the process of being medically optimized for surgery. Presently pain is associated with nonbilious emesis and nausea, patient reports normal BM, last Bm was at 4am, denies flatus since.Denies fever/chills, No photophobia/eye pain/changes in vision, No ear pain/sore throat/dysphagia, No chest pain/palpitations, no SOB/cough/wheeze/stridor, No D, no dysuria/frequency/discharge, No neck/back pain, no rash, no changes in neurological status/function. (13 Feb 2019 10:41)      Post op; no distress;   HD MWF usually; but sometimes goes twice weekly;      "    PAST MEDICAL & SURGICAL HISTORY:  Dialysis patient  Cerebral edema  History of renal transplant  ESRD (end stage renal disease)  HTN - Hypertension  S/P dialysis catheter insertion: Right chest  H/O hernia repair: Abdominal ( 2017 )  Tubal ligation status: 12 years ago  History of renal transplant    FAMILY HISTORY:    Allergies    Dilaudid (Flushing (Severe); Short breath (Mild to Mod); Faint (Mod to Severe))    Intolerances      Home Medications:  clonidine: 0.3  transdermal once a week (13 Feb 2019 09:10)  cloNIDine 0.3 mg oral tablet: tab(s) orally 3 times a day (13 Feb 2019 09:10)  NIFEdipine 90 mg oral tablet, extended release: 1 tab(s) orally once a day (13 Feb 2019 09:10)  Prograf: 3 milligram(s) orally 2 times a day (13 Feb 2019 09:10)  Renvela 800 mg oral tablet: 1 tab(s) orally 3 times a day (with meals) (13 Feb 2019 09:10)  tacrolimus 5 mg oral capsule: 1 cap(s) orally every 12 hours (13 Feb 2019 09:10)    MEDICATIONS  (STANDING):  carvedilol 25 milliGRAM(s) Oral every 12 hours  ceFAZolin   IVPB 2000 milliGRAM(s) IV Intermittent every 8 hours  cloNIDine 0.3 milliGRAM(s) Oral three times a day  sevelamer hydrochloride 800 milliGRAM(s) Oral three times a day    MEDICATIONS  (PRN):  benzocaine 15 mG/menthol 3.6 mG Lozenge 1 Lozenge Oral every 2 hours PRN Sore Throat    Vital Signs Last 24 Hrs  T(C): 36.8 (13 Feb 2019 17:02), Max: 36.9 (13 Feb 2019 09:00)  T(F): 98.2 (13 Feb 2019 17:02), Max: 98.5 (13 Feb 2019 09:00)  HR: 66 (13 Feb 2019 17:02) (61 - 86)  BP: 177/95 (13 Feb 2019 17:02) (132/84 - 201/120)  BP(mean): 120 (13 Feb 2019 15:00) (99 - 125)  RR: 18 (13 Feb 2019 17:02) (13 - 23)  SpO2: 100% (13 Feb 2019 17:02) (95% - 100%)    Daily Height in cm: 170.18 (13 Feb 2019 16:00)    Daily     CAPILLARY BLOOD GLUCOSE        PHYSICAL EXAM:      T(C): 36.8 (02-13-19 @ 17:02), Max: 36.9 (02-13-19 @ 09:00)  HR: 66 (02-13-19 @ 17:02) (61 - 86)  BP: 177/95 (02-13-19 @ 17:02) (132/84 - 201/120)  RR: 18 (02-13-19 @ 17:02) (13 - 23)  SpO2: 100% (02-13-19 @ 17:02) (95% - 100%)  Wt(kg): --  Respiratory: clear anteriorly, decreased BS at bases  Cardiovascular: S1 S2  Gastrointestinal: soft NT ND no BS incisional pain  Extremities:  tr  edema              02-13    142  |  98  |  65<H>  ----------------------------<  118<H>  5.1   |  26  |  13.00<H>    Ca    10.3<H>      13 Feb 2019 05:40    TPro  9.0<H>  /  Alb  4.0  /  TBili  0.3  /  DBili  x   /  AST  15  /  ALT  17  /  AlkPhos  89  02-13                          11.5   10.94 )-----------( 207      ( 13 Feb 2019 05:40 )             37.0             Assessment and Plan    ESRD; post op;   No immediate indication for HD;   Will arrange for AM;   Will follow course. Information from chart:  "Patient is a 48y old  Female who presents with a chief complaint of Abdominal pain found to have SBO secondary to incarcerated ventral/ incisional hernia (13 Feb 2019 14:29)    HPI:  49y/o F with PMH of Kidney transplant that failed 2yrs ago currently on HD( MWF), HTN, PSH of incisional hernia repair in 2017 presents in ED c/o abdominal pain since last night. Pt states that pain started~3wks ago, was episodic and would resolve with warm beverages, however pt states last night pain was severe and tea didnt help. Pain is a 10/10 and in located at hernia site. Pt states that she noticed hernia a couple months ago and bulge has gotten progressively larger, she went to see Dr. Mcadams for elective hernia repair and was in the process of being medically optimized for surgery. Presently pain is associated with nonbilious emesis and nausea, patient reports normal BM, last Bm was at 4am, denies flatus since.Denies fever/chills, No photophobia/eye pain/changes in vision, No ear pain/sore throat/dysphagia, No chest pain/palpitations, no SOB/cough/wheeze/stridor, No D, no dysuria/frequency/discharge, No neck/back pain, no rash, no changes in neurological status/function. (13 Feb 2019 10:41)      Post op; no distress;   HD MWF usually; but sometimes goes twice weekly;   Patient still on Prograf 5 mg q12     "    PAST MEDICAL & SURGICAL HISTORY:  Dialysis patient  Cerebral edema  History of renal transplant  ESRD (end stage renal disease)  HTN - Hypertension  S/P dialysis catheter insertion: Right chest  H/O hernia repair: Abdominal ( 2017 )  Tubal ligation status: 12 years ago  History of renal transplant    FAMILY HISTORY:    Allergies    Dilaudid (Flushing (Severe); Short breath (Mild to Mod); Faint (Mod to Severe))    Intolerances      Home Medications:  clonidine: 0.3  transdermal once a week (13 Feb 2019 09:10)  cloNIDine 0.3 mg oral tablet: tab(s) orally 3 times a day (13 Feb 2019 09:10)  NIFEdipine 90 mg oral tablet, extended release: 1 tab(s) orally once a day (13 Feb 2019 09:10)  Prograf: 3 milligram(s) orally 2 times a day (13 Feb 2019 09:10)  Renvela 800 mg oral tablet: 1 tab(s) orally 3 times a day (with meals) (13 Feb 2019 09:10)  tacrolimus 5 mg oral capsule: 1 cap(s) orally every 12 hours (13 Feb 2019 09:10)    MEDICATIONS  (STANDING):  carvedilol 25 milliGRAM(s) Oral every 12 hours  ceFAZolin   IVPB 2000 milliGRAM(s) IV Intermittent every 8 hours  cloNIDine 0.3 milliGRAM(s) Oral three times a day  sevelamer hydrochloride 800 milliGRAM(s) Oral three times a day    MEDICATIONS  (PRN):  benzocaine 15 mG/menthol 3.6 mG Lozenge 1 Lozenge Oral every 2 hours PRN Sore Throat    Vital Signs Last 24 Hrs  T(C): 36.8 (13 Feb 2019 17:02), Max: 36.9 (13 Feb 2019 09:00)  T(F): 98.2 (13 Feb 2019 17:02), Max: 98.5 (13 Feb 2019 09:00)  HR: 66 (13 Feb 2019 17:02) (61 - 86)  BP: 177/95 (13 Feb 2019 17:02) (132/84 - 201/120)  BP(mean): 120 (13 Feb 2019 15:00) (99 - 125)  RR: 18 (13 Feb 2019 17:02) (13 - 23)  SpO2: 100% (13 Feb 2019 17:02) (95% - 100%)    Daily Height in cm: 170.18 (13 Feb 2019 16:00)    Daily     CAPILLARY BLOOD GLUCOSE        PHYSICAL EXAM:      T(C): 36.8 (02-13-19 @ 17:02), Max: 36.9 (02-13-19 @ 09:00)  HR: 66 (02-13-19 @ 17:02) (61 - 86)  BP: 177/95 (02-13-19 @ 17:02) (132/84 - 201/120)  RR: 18 (02-13-19 @ 17:02) (13 - 23)  SpO2: 100% (02-13-19 @ 17:02) (95% - 100%)  Wt(kg): --  Respiratory: clear anteriorly, decreased BS at bases  Cardiovascular: S1 S2  Gastrointestinal: soft NT ND no BS incisional pain  Extremities:  tr  edema              02-13    142  |  98  |  65<H>  ----------------------------<  118<H>  5.1   |  26  |  13.00<H>    Ca    10.3<H>      13 Feb 2019 05:40    TPro  9.0<H>  /  Alb  4.0  /  TBili  0.3  /  DBili  x   /  AST  15  /  ALT  17  /  AlkPhos  89  02-13                          11.5   10.94 )-----------( 207      ( 13 Feb 2019 05:40 )             37.0             Assessment and Plan    ESRD; post op;   No immediate indication for HD;   HTN; resume medications; IV support as tolerated;  Will arrange for AM;   Continue prograf for now, should have outpatient slow taper   Will follow course.

## 2019-02-13 NOTE — PHARMACY COMMUNICATION NOTE - COMMENTS
s/w dr reyes - aware pt on both clondine patch and clondine tabs, but wants to give [id number] of clonidine tabs until pt's BP is controlled and patch is effective                         - verfied prograf dose as 5mg po q12h as pt's home dose and wants to continue as such

## 2019-02-13 NOTE — CONSULT NOTE ADULT - ASSESSMENT
1.  SBO due to incarcerated ventral hernia.  Surgery for today planned.  No medical objection for surgery, higher risk due to comorbid conditions and emergent nature of surgery, not modifiable.  Cardiology to evaluate for perioperative risk assessment.  Pain medication per surgery.  Protonix 40 mg IV daily.    2.  Severe uncontrolled HTN - due to missing some oral medications and severe pain.  Continue clonidine patch 0.3 mg weekly.  Hydralazine 10 mg IV q4 hours with parameters.  Metoprolol (Labetalol not available) 5 mg IV q4 hours with parameters.  If needed, nitropatch or IV Enalapril if no nephrology objection.    3.  ESRD on hemodialysis.  Consult nephrology.    4.  Failed renal transplant.  Follow-up nephrology recommendations.
47y/o F with PMH of Kidney transplant that failed 2yrs ago currently on HD( MWF), HTN, PSH of incisional hernia repair in 2017 presents in ED c/o abdominal pain since last night. Pt states that pain started~3wks ago, was episodic and would resolve with warm beverages, however pt states last night pain was severe and tea didnt help. Pain is a 10/10 and in located at hernia site. Pt states that she noticed hernia a couple months ago and bulge has gotten progressively larger, she went to see Dr. Mcadams for elective hernia repair and was in the process of being medically optimized for surgery. Presently pain is associated with nonbilious emesis and nausea, patient reports normal BM, last Bm was at 4am, denies flatus.  Currently being prepped for surgery for SBO.  Unable to tolerate oral meds today. Has history of difficult to control hypertension and BPs 200s/110s.    Reports having echo and stress last year prior to renal transplant.  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   Tessie neg x 1  ECG: sinus calista 58bpm; LVH; lateral TWIs    Pt with an acute abdomen and risks of waiting outweigh benefits; no cardiac contraindication to emergent surgery.    Remains asymptomatic from a cardiac standpoint; Tessie neg  EKG with nonspecific lateral changes but no significant ischemia.  2D echo pending  Blood pressure control; may require IV dosing as she is currently not tolerating PO

## 2019-02-13 NOTE — PROGRESS NOTE ADULT - SUBJECTIVE AND OBJECTIVE BOX
Post-op check    S/P repair of incarcerated ventral hernia POD#0  48 year old Female seen and examined at bedside. Admits to mild incisional pain well controlled with medication. Tolerating ice chips. Voided. +Flatus. Denies chest pain, shortness of breath, nausea/ vomiting, and dizziness.     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

## 2019-02-14 LAB
ANION GAP SERPL CALC-SCNC: 15 MMOL/L — SIGNIFICANT CHANGE UP (ref 5–17)
BUN SERPL-MCNC: 78 MG/DL — HIGH (ref 7–23)
CALCIUM SERPL-MCNC: 9.8 MG/DL — SIGNIFICANT CHANGE UP (ref 8.5–10.1)
CHLORIDE SERPL-SCNC: 102 MMOL/L — SIGNIFICANT CHANGE UP (ref 96–108)
CO2 SERPL-SCNC: 20 MMOL/L — LOW (ref 22–31)
CREAT SERPL-MCNC: 15.4 MG/DL — HIGH (ref 0.5–1.3)
GLUCOSE SERPL-MCNC: 88 MG/DL — SIGNIFICANT CHANGE UP (ref 70–99)
HCT VFR BLD CALC: 38.8 % — SIGNIFICANT CHANGE UP (ref 34.5–45)
HGB BLD-MCNC: 11.8 G/DL — SIGNIFICANT CHANGE UP (ref 11.5–15.5)
MAGNESIUM SERPL-MCNC: 2.6 MG/DL — SIGNIFICANT CHANGE UP (ref 1.6–2.6)
MCHC RBC-ENTMCNC: 29.8 PG — SIGNIFICANT CHANGE UP (ref 27–34)
MCHC RBC-ENTMCNC: 30.4 GM/DL — LOW (ref 32–36)
MCV RBC AUTO: 98 FL — SIGNIFICANT CHANGE UP (ref 80–100)
NRBC # BLD: 0 /100 WBCS — SIGNIFICANT CHANGE UP (ref 0–0)
PHOSPHATE SERPL-MCNC: 6.1 MG/DL — HIGH (ref 2.5–4.5)
PLATELET # BLD AUTO: 183 K/UL — SIGNIFICANT CHANGE UP (ref 150–400)
POTASSIUM SERPL-MCNC: 5.5 MMOL/L — HIGH (ref 3.5–5.3)
POTASSIUM SERPL-SCNC: 5.5 MMOL/L — HIGH (ref 3.5–5.3)
RBC # BLD: 3.96 M/UL — SIGNIFICANT CHANGE UP (ref 3.8–5.2)
RBC # FLD: 14.6 % — HIGH (ref 10.3–14.5)
SODIUM SERPL-SCNC: 137 MMOL/L — SIGNIFICANT CHANGE UP (ref 135–145)
SURGICAL PATHOLOGY STUDY: SIGNIFICANT CHANGE UP
WBC # BLD: 8.45 K/UL — SIGNIFICANT CHANGE UP (ref 3.8–10.5)
WBC # FLD AUTO: 8.45 K/UL — SIGNIFICANT CHANGE UP (ref 3.8–10.5)

## 2019-02-14 PROCEDURE — 99233 SBSQ HOSP IP/OBS HIGH 50: CPT

## 2019-02-14 RX ORDER — OXYCODONE AND ACETAMINOPHEN 5; 325 MG/1; MG/1
2 TABLET ORAL EVERY 6 HOURS
Qty: 0 | Refills: 0 | Status: DISCONTINUED | OUTPATIENT
Start: 2019-02-14 | End: 2019-02-14

## 2019-02-14 RX ORDER — HYDRALAZINE HCL 50 MG
10 TABLET ORAL EVERY 4 HOURS
Qty: 0 | Refills: 0 | Status: DISCONTINUED | OUTPATIENT
Start: 2019-02-14 | End: 2019-02-15

## 2019-02-14 RX ORDER — MORPHINE SULFATE 50 MG/1
2 CAPSULE, EXTENDED RELEASE ORAL ONCE
Qty: 0 | Refills: 0 | Status: DISCONTINUED | OUTPATIENT
Start: 2019-02-14 | End: 2019-02-14

## 2019-02-14 RX ORDER — ACETAMINOPHEN 500 MG
650 TABLET ORAL EVERY 4 HOURS
Qty: 0 | Refills: 0 | Status: DISCONTINUED | OUTPATIENT
Start: 2019-02-14 | End: 2019-02-15

## 2019-02-14 RX ORDER — SIMETHICONE 80 MG/1
80 TABLET, CHEWABLE ORAL EVERY 6 HOURS
Qty: 0 | Refills: 0 | Status: DISCONTINUED | OUTPATIENT
Start: 2019-02-14 | End: 2019-02-15

## 2019-02-14 RX ADMIN — CARVEDILOL PHOSPHATE 25 MILLIGRAM(S): 80 CAPSULE, EXTENDED RELEASE ORAL at 05:32

## 2019-02-14 RX ADMIN — Medication 0.3 MILLIGRAM(S): at 08:08

## 2019-02-14 RX ADMIN — Medication 1 PATCH: at 16:25

## 2019-02-14 RX ADMIN — SEVELAMER CARBONATE 800 MILLIGRAM(S): 2400 POWDER, FOR SUSPENSION ORAL at 22:45

## 2019-02-14 RX ADMIN — CARVEDILOL PHOSPHATE 25 MILLIGRAM(S): 80 CAPSULE, EXTENDED RELEASE ORAL at 17:51

## 2019-02-14 RX ADMIN — Medication 650 MILLIGRAM(S): at 23:45

## 2019-02-14 RX ADMIN — Medication 650 MILLIGRAM(S): at 16:54

## 2019-02-14 RX ADMIN — Medication 100 MILLIGRAM(S): at 04:30

## 2019-02-14 RX ADMIN — HEPARIN SODIUM 5000 UNIT(S): 5000 INJECTION INTRAVENOUS; SUBCUTANEOUS at 05:26

## 2019-02-14 RX ADMIN — Medication 0.3 MILLIGRAM(S): at 17:59

## 2019-02-14 RX ADMIN — TACROLIMUS 5 MILLIGRAM(S): 5 CAPSULE ORAL at 17:51

## 2019-02-14 RX ADMIN — MORPHINE SULFATE 2 MILLIGRAM(S): 50 CAPSULE, EXTENDED RELEASE ORAL at 08:03

## 2019-02-14 RX ADMIN — MORPHINE SULFATE 2 MILLIGRAM(S): 50 CAPSULE, EXTENDED RELEASE ORAL at 08:18

## 2019-02-14 RX ADMIN — SEVELAMER CARBONATE 800 MILLIGRAM(S): 2400 POWDER, FOR SUSPENSION ORAL at 16:24

## 2019-02-14 RX ADMIN — Medication 1 PATCH: at 20:08

## 2019-02-14 RX ADMIN — Medication 650 MILLIGRAM(S): at 17:55

## 2019-02-14 RX ADMIN — SIMETHICONE 80 MILLIGRAM(S): 80 TABLET, CHEWABLE ORAL at 23:44

## 2019-02-14 RX ADMIN — TACROLIMUS 5 MILLIGRAM(S): 5 CAPSULE ORAL at 05:32

## 2019-02-14 RX ADMIN — Medication 90 MILLIGRAM(S): at 05:32

## 2019-02-14 RX ADMIN — Medication 650 MILLIGRAM(S): at 22:45

## 2019-02-14 RX ADMIN — HEPARIN SODIUM 5000 UNIT(S): 5000 INJECTION INTRAVENOUS; SUBCUTANEOUS at 17:53

## 2019-02-14 NOTE — CHART NOTE - NSCHARTNOTEFT_GEN_A_CORE
AM lab review:                          11.8   8.45  )-----------( 183      ( 14 Feb 2019 07:16 )             38.8       02-14    137  |  102  |  78<H>  ----------------------------<  88  5.5<H>   |  20<L>  |  15.40<H>    Ca    9.8      14 Feb 2019 07:16  Phos  6.1     02-14  Mg     2.6     02-14    Impression & Plan:  pt has ESRD to go for HD later today

## 2019-02-14 NOTE — PROGRESS NOTE ADULT - SUBJECTIVE AND OBJECTIVE BOX
Post Anesthesia Evaluation    POD 1 Ex Lap for SBO    Patient doing well, NAD, VSS, Pain adequately controlled.  No apparent anesthetic complications

## 2019-02-14 NOTE — PROGRESS NOTE ADULT - SUBJECTIVE AND OBJECTIVE BOX
Patient is a 48y old  Female who presents with a chief complaint of Abdominal pain found to have SBO secondary to incarcerated ventral/ incisional hernia (14 Feb 2019 08:57)      INTERVAL HPI/OVERNIGHT EVENTS:    SBO - had repair of incarcerated ventral hernia yesterday.  No N/V.  Pain 5/10 after receiving IV morphine 30 minutes ago.  Reports flatus.    HTN - BPs improved after home medications restarted last night.    ESRD - last dialysis was 2/11.  Denied SOB or orthopnea.  Voids small amount daily.    REVIEW OF SYSTEMS:  Denied acute SOB or cough.  Reports sore throat from NGT attempts yesterday.    MEDICATIONS  (STANDING):  carvedilol 25 milliGRAM(s) Oral every 12 hours  cloNIDine 0.3 milliGRAM(s) Oral three times a day  cloNIDine Patch 0.3 mG/24Hr(s) 1 patch Transdermal every 7 days  heparin  Injectable 5000 Unit(s) SubCutaneous every 12 hours  NIFEdipine XL 90 milliGRAM(s) Oral daily  sevelamer hydrochloride 800 milliGRAM(s) Oral three times a day  tacrolimus 5 milliGRAM(s) Oral every 12 hours    MEDICATIONS  (PRN):  benzocaine 15 mG/menthol 3.6 mG Lozenge 1 Lozenge Oral every 2 hours PRN Sore Throat  hydrALAZINE Injectable 10 milliGRAM(s) IV Push every 4 hours PRN SBP >160 or DBP >90    Allergies    Dilaudid (Flushing (Severe); Short breath (Mild to Mod); Faint (Mod to Severe))    Intolerances    Vital Signs Last 24 Hrs  T(C): 36.5 (14 Feb 2019 07:00), Max: 36.8 (13 Feb 2019 17:02)  T(F): 97.7 (14 Feb 2019 07:00), Max: 98.2 (13 Feb 2019 17:02)  HR: 83 (14 Feb 2019 07:00) (61 - 86)  BP: 139/77 (14 Feb 2019 07:00) (132/84 - 188/99)  BP(mean): 120 (13 Feb 2019 15:00) (99 - 125)  RR: 16 (14 Feb 2019 07:00) (13 - 23)  SpO2: 97% (14 Feb 2019 07:00) (95% - 100%)    PHYSICAL EXAM:  GENERAL: NAD, looks better than yesterday.  CHEST/LUNG: Clear to auscultation; No rales, rhonchi, or wheezing.  Respiratory effort does not appear labored.  HEART: Regular rate and rhythm; S1 and S2,  no murmurs, rubs, or gallops.  ABDOMEN: Soft, tender to palpation.  No masses or HSM appreciated.  No distension.  Bowel sounds present.  Dressing clean and dry.  EXTREMITIES:  No clubbing, cyanosis, or edema.  Moves all extremities with strength 5/5.  SKIN: No obvious rashes or lesions.  Turgor okay.  Right chest wall HD catheter.  NEURO:  Alert and oriented x 3, no focal sensory or  motor deficit, DTR 2+ bilaterally.    LABS:                        11.8   8.45  )-----------( 183      ( 14 Feb 2019 07:16 )             38.8     02-14    137  |  102  |  78<H>  ----------------------------<  88  5.5<H>   |  20<L>  |  15.40<H>    Ca    9.8      14 Feb 2019 07:16  Phos  6.1     02-14  Mg     2.6     02-14    TPro  9.0<H>  /  Alb  4.0  /  TBili  0.3  /  DBili  x   /  AST  15  /  ALT  17  /  AlkPhos  89  02-13    PT/INR - ( 13 Feb 2019 05:40 )   PT: 11.4 sec;   INR: 1.02 ratio         PTT - ( 13 Feb 2019 05:40 )  PTT:37.6 sec    CAPILLARY BLOOD GLUCOSE

## 2019-02-14 NOTE — PROGRESS NOTE ADULT - SUBJECTIVE AND OBJECTIVE BOX
Subjective: POD #1 vent hernia repair. Mild abd pain. Seen on HD.       MEDICATIONS  (STANDING):  carvedilol 25 milliGRAM(s) Oral every 12 hours  cloNIDine 0.3 milliGRAM(s) Oral two times a day  cloNIDine Patch 0.3 mG/24Hr(s) 1 patch Transdermal every 7 days  heparin  Injectable 5000 Unit(s) SubCutaneous every 12 hours  NIFEdipine XL 90 milliGRAM(s) Oral daily  sevelamer hydrochloride 800 milliGRAM(s) Oral three times a day  tacrolimus 5 milliGRAM(s) Oral every 12 hours    MEDICATIONS  (PRN):  benzocaine 15 mG/menthol 3.6 mG Lozenge 1 Lozenge Oral every 2 hours PRN Sore Throat  hydrALAZINE Injectable 10 milliGRAM(s) IV Push every 4 hours PRN SBP >160 or DBP >90          T(C): 36.5 (02-14-19 @ 07:00), Max: 36.8 (02-13-19 @ 17:02)  HR: 83 (02-14-19 @ 07:00) (61 - 86)  BP: 139/77 (02-14-19 @ 07:00) (132/84 - 188/99)  RR: 16 (02-14-19 @ 07:00) (13 - 23)  SpO2: 97% (02-14-19 @ 07:00) (95% - 100%)  Wt(kg): --        I&O's Detail    13 Feb 2019 07:01  -  14 Feb 2019 07:00  --------------------------------------------------------  IN:    IV PiggyBack: 100 mL  Total IN: 100 mL    OUT:  Total OUT: 0 mL    Total NET: 100 mL               PHYSICAL EXAM:    GENERAL: comfortable  EYES: EOMI, PERRLA, conjunctiva and sclera clear  NECK: Supple, no inc in JVP  CHEST/LUNG: Clear  HEART: S1S2  ABDOMEN: Soft, distended. Pos BS. Pos op dressing  EXTREMITIES:  no edema  NEURO: no asterixis  R chest PC      LABS:  CBC Full  -  ( 14 Feb 2019 07:16 )  WBC Count : 8.45 K/uL  Hemoglobin : 11.8 g/dL  Hematocrit : 38.8 %  Platelet Count - Automated : 183 K/uL  Mean Cell Volume : 98.0 fl  Mean Cell Hemoglobin : 29.8 pg  Mean Cell Hemoglobin Concentration : 30.4 gm/dL  Auto Neutrophil # : x  Auto Lymphocyte # : x  Auto Monocyte # : x  Auto Eosinophil # : x  Auto Basophil # : x  Auto Neutrophil % : x  Auto Lymphocyte % : x  Auto Monocyte % : x  Auto Eosinophil % : x  Auto Basophil % : x    02-14    137  |  102  |  78<H>  ----------------------------<  88  5.5<H>   |  20<L>  |  15.40<H>    Ca    9.8      14 Feb 2019 07:16  Phos  6.1     02-14  Mg     2.6     02-14    TPro  9.0<H>  /  Alb  4.0  /  TBili  0.3  /  DBili  x   /  AST  15  /  ALT  17  /  AlkPhos  89  02-13    PT/INR - ( 13 Feb 2019 05:40 )   PT: 11.4 sec;   INR: 1.02 ratio         PTT - ( 13 Feb 2019 05:40 )  PTT:37.6 sec        Impression:  * HD dependent ESRD. Failed cadaveric transplant  * SBO. POD #1 repair of ventral hernia    Recommendations:   * Stable HD today as Rxed. UF goal 2.5kg  * Diet advanced to clears

## 2019-02-14 NOTE — PROGRESS NOTE ADULT - ASSESSMENT
1.  SBO - status post repair of ventral hernia.  Advance diet per surgery.  Pain medications.  Lozenges for throat.    2.  Hypertension - improved.  Outpatient medications restarted.  Patient states clonidine patch 0.3 mg weekly and 0.3 mg tab twice daily.    3.  ESRD on hemodialysis.   Dialysis per nephrology.    4.  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  Continue tacrolimus.  Taper per nephrology.

## 2019-02-14 NOTE — PROGRESS NOTE ADULT - SUBJECTIVE AND OBJECTIVE BOX
Postoperative Day #1 s/p repair of incarcerated ventral hernia     Patient seen and examined at bedside resting comfortably. Complaining only of sore throat from earlier NGT insertion. Mild incisional pain, well controlled with pain meds. +Flatus, no BM. Ambulating.   Denies nausea and vomiting. Tolerating sips of water and ice chips. Asking for food/liquids.  Denies fevers, chills, chest pain, dyspnea, cough.    T(F): 97.5 (02-13-19 @ 23:00), Max: 98.5 (02-13-19 @ 09:00)  HR: 79 (02-13-19 @ 23:00) (61 - 86)  BP: 137/81 (02-14-19 @ 01:01) (132/84 - 201/120)  RR: 18 (02-13-19 @ 23:00) (13 - 23)  SpO2: 98% (02-13-19 @ 23:00) (95% - 100%)  Wt(kg): --  CAPILLARY BLOOD GLUCOSE    PHYSICAL EXAM:  General: NAD, A&Ox3  CV: +S1+S2 regular rate and rhythm  Lung: clear to auscultation bilaterally, respirations nonlabored, good inspiratory effort  Abdomen: obese, non-distended, normoactive BS, appropriate incisional tenderness. Dressing c/d/i.   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

## 2019-02-15 ENCOUNTER — TRANSCRIPTION ENCOUNTER (OUTPATIENT)
Age: 49
End: 2019-02-15

## 2019-02-15 VITALS
TEMPERATURE: 98 F | RESPIRATION RATE: 18 BRPM | SYSTOLIC BLOOD PRESSURE: 139 MMHG | HEART RATE: 88 BPM | DIASTOLIC BLOOD PRESSURE: 66 MMHG

## 2019-02-15 PROCEDURE — 99232 SBSQ HOSP IP/OBS MODERATE 35: CPT

## 2019-02-15 RX ORDER — NIFEDIPINE 30 MG
1 TABLET, EXTENDED RELEASE 24 HR ORAL
Qty: 0 | Refills: 0 | COMMUNITY

## 2019-02-15 RX ORDER — TACROLIMUS 5 MG/1
3 CAPSULE ORAL
Qty: 0 | Refills: 0 | COMMUNITY

## 2019-02-15 RX ORDER — SEVELAMER CARBONATE 2400 MG/1
1 POWDER, FOR SUSPENSION ORAL
Qty: 0 | Refills: 0 | COMMUNITY

## 2019-02-15 RX ORDER — LORATADINE 10 MG/1
10 TABLET ORAL DAILY
Qty: 0 | Refills: 0 | Status: DISCONTINUED | OUTPATIENT
Start: 2019-02-15 | End: 2019-02-15

## 2019-02-15 RX ORDER — TACROLIMUS 5 MG/1
1 CAPSULE ORAL
Qty: 0 | Refills: 0 | COMMUNITY

## 2019-02-15 RX ADMIN — Medication 650 MILLIGRAM(S): at 08:52

## 2019-02-15 RX ADMIN — Medication 1 PATCH: at 07:26

## 2019-02-15 RX ADMIN — Medication 650 MILLIGRAM(S): at 09:52

## 2019-02-15 RX ADMIN — Medication 90 MILLIGRAM(S): at 05:40

## 2019-02-15 RX ADMIN — Medication 650 MILLIGRAM(S): at 16:34

## 2019-02-15 RX ADMIN — LORATADINE 10 MILLIGRAM(S): 10 TABLET ORAL at 05:42

## 2019-02-15 RX ADMIN — HEPARIN SODIUM 5000 UNIT(S): 5000 INJECTION INTRAVENOUS; SUBCUTANEOUS at 05:40

## 2019-02-15 RX ADMIN — TACROLIMUS 5 MILLIGRAM(S): 5 CAPSULE ORAL at 05:40

## 2019-02-15 RX ADMIN — SEVELAMER CARBONATE 800 MILLIGRAM(S): 2400 POWDER, FOR SUSPENSION ORAL at 07:36

## 2019-02-15 NOTE — DISCHARGE NOTE PROVIDER - CARE PROVIDER_API CALL
Maru Mcadams)  Surgery  [address], [address]  [place] [address]  Phone: [phone]  Fax: [phone]  Follow Up Time:

## 2019-02-15 NOTE — PROGRESS NOTE ADULT - REASON FOR ADMISSION
Abdominal pain found to have SBO secondary to incarcerated ventral/ incisional hernia

## 2019-02-15 NOTE — PROGRESS NOTE ADULT - ASSESSMENT
1.  SBO - status post repair of ventral hernia.  Advance diet per surgery.  Pain medications.  Lozenges for throat.    2.  Hypertension - improved.  Outpatient medications restarted.  Patient states clonidine patch 0.3 mg weekly and 0.3 mg tab twice daily.    3.  ESRD on hemodialysis.   Dialysis per nephrology. treatment today     4.  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  Continue tacrolimus.  Taper per nephrology.

## 2019-02-15 NOTE — DISCHARGE NOTE NURSING/CASE MANAGEMENT/SOCIAL WORK - NSDCDPATPORTLINK_GEN_ALL_CORE
You can access the [facility] Patient Portal, offered by [facility], by registering with the following website: [email or web address]

## 2019-02-15 NOTE — DISCHARGE NOTE PROVIDER - HOSPITAL COURSE
49y/o F with PMH of Kidney transplant that failed 2yrs ago currently on HD (MWF), HTN, PSH of incisional hernia repair in 2017 presented c/o abdominal pain that pain started~3wks ago; she noticed hernia a couple months ago and bulge has gotten progressively larger. Pt underwent repair of incarcerated ventral hernia on 2/13/19, diet was advanced and tolerated and she was stable for discharge home on 2/15/19.

## 2019-02-15 NOTE — PROGRESS NOTE ADULT - SUBJECTIVE AND OBJECTIVE BOX
Patient feels well no complaints today.    MEDICATIONS  (STANDING):  carvedilol 25 milliGRAM(s) Oral every 12 hours  cloNIDine 0.3 milliGRAM(s) Oral two times a day  heparin  Injectable 5000 Unit(s) SubCutaneous every 12 hours  loratadine 10 milliGRAM(s) Oral daily  NIFEdipine XL 90 milliGRAM(s) Oral daily  sevelamer hydrochloride 800 milliGRAM(s) Oral three times a day  tacrolimus 5 milliGRAM(s) Oral every 12 hours    MEDICATIONS  (PRN):  acetaminophen   Tablet .. 650 milliGRAM(s) Oral every 4 hours PRN Moderate Pain (4 - 6)  benzocaine 15 mG/menthol 3.6 mG Lozenge 1 Lozenge Oral every 2 hours PRN Sore Throat  hydrALAZINE Injectable 10 milliGRAM(s) IV Push every 4 hours PRN SBP >160 or DBP >90  simethicone 80 milliGRAM(s) Chew every 6 hours PRN Gas      02-14-19 @ 07:01  -  02-15-19 @ 07:00  --------------------------------------------------------  IN: 0 mL / OUT: 1500 mL / NET: -1500 mL    02-15-19 @ 07:01  -  02-15-19 @ 18:28  --------------------------------------------------------  IN: 350 mL / OUT: 0 mL / NET: 350 mL      PHYSICAL EXAM:      T(C): 36.7 (02-15-19 @ 17:21), Max: 37.2 (02-15-19 @ 00:01)  HR: 88 (02-15-19 @ 17:21) (71 - 88)  BP: 139/66 (02-15-19 @ 17:21) (90/45 - 139/66)  RR: 18 (02-15-19 @ 17:21) (16 - 18)  SpO2: 95% (02-15-19 @ 16:15) (95% - 96%)  Wt(kg): --  Respiratory: clear anteriorly, decreased BS at bases  Cardiovascular: S1 S2  Gastrointestinal: soft NT ND +BS  Extremities:   1 edema                                    11.8   8.45  )-----------( 183      ( 14 Feb 2019 07:16 )             38.8     02-14    137  |  102  |  78<H>  ----------------------------<  88  5.5<H>   |  20<L>  |  15.40<H>    Ca    9.8      14 Feb 2019 07:16  Phos  6.1     02-14  Mg     2.6     02-14            Assessment and Plan:  Maintenance HD  UF as tolerated;   Will follow.

## 2019-02-15 NOTE — PROGRESS NOTE ADULT - SUBJECTIVE AND OBJECTIVE BOX
Surgery NP note  Patient seen and examined bedside resting comfortably.  No complaints offered. Abdominal pain is well controlled.  Denies nausea and vomiting. Tolerating diet.  Normal flatus/BM.     T(F): 98.1 (02-15-19 @ 05:59), Max: 99.2 (02-14-19 @ 18:11)  HR: 71 (02-15-19 @ 05:59) (68 - 79)  BP: 129/59 (02-15-19 @ 05:59) (94/53 - 148/79)  RR: 16 (02-15-19 @ 05:59) (16 - 17)  SpO2: 95% (02-15-19 @ 05:59) (95% - 99%)  Wt(kg): --  CAPILLARY BLOOD GLUCOSE          PHYSICAL EXAM:  General: NAD, WDWN.   Neuro:  Alert & responsive  HEENT: NCAT, EOMI, conjunctiva clear  CV: +S1+S2 regular rate and rhythm  Lung: clear to ausculation bilaterally, respirations nonlabored, good inspiratory effort  Abdomen:  obese, non-distended, normoactive BS, appropriate incisional tenderness. Dressing c/d/i.   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

## 2019-02-15 NOTE — DISCHARGE NOTE PROVIDER - NSDCCPCAREPLAN_GEN_ALL_CORE_FT
PRINCIPAL DISCHARGE DIAGNOSIS  Problem: Incarcerated ventral hernia  Assessment and Plan of Treatment:

## 2019-02-15 NOTE — PROGRESS NOTE ADULT - SUBJECTIVE AND OBJECTIVE BOX
Patient is a 48y old  Female who presents with a chief complaint of Abdominal pain found to have SBO secondary to incarcerated ventral/ incisional hernia (15 Feb 2019 07:04)      INTERVAL HPI/OVERNIGHT EVENTS: no events     MEDICATIONS  (STANDING):  carvedilol 25 milliGRAM(s) Oral every 12 hours  cloNIDine 0.3 milliGRAM(s) Oral two times a day  cloNIDine Patch 0.3 mG/24Hr(s) 1 patch Transdermal every 7 days  heparin  Injectable 5000 Unit(s) SubCutaneous every 12 hours  loratadine 10 milliGRAM(s) Oral daily  NIFEdipine XL 90 milliGRAM(s) Oral daily  sevelamer hydrochloride 800 milliGRAM(s) Oral three times a day  tacrolimus 5 milliGRAM(s) Oral every 12 hours    MEDICATIONS  (PRN):  acetaminophen   Tablet .. 650 milliGRAM(s) Oral every 4 hours PRN Moderate Pain (4 - 6)  benzocaine 15 mG/menthol 3.6 mG Lozenge 1 Lozenge Oral every 2 hours PRN Sore Throat  hydrALAZINE Injectable 10 milliGRAM(s) IV Push every 4 hours PRN SBP >160 or DBP >90  simethicone 80 milliGRAM(s) Chew every 6 hours PRN Gas      Allergies    Dilaudid (Flushing (Severe); Short breath (Mild to Mod); Faint (Mod to Severe))    Intolerances           Vital Signs Last 24 Hrs  T(C): 37 (15 Feb 2019 11:44), Max: 37.3 (14 Feb 2019 18:11)  T(F): 98.6 (15 Feb 2019 11:44), Max: 99.2 (14 Feb 2019 18:11)  HR: 74 (15 Feb 2019 11:44) (68 - 79)  BP: 108/55 (15 Feb 2019 11:44) (94/53 - 129/59)  BP(mean): --  RR: 16 (15 Feb 2019 11:44) (16 - 17)  SpO2: 96% (15 Feb 2019 11:44) (95% - 99%)    PHYSICAL EXAM:  GENERAL: NAD, well-groomed, well-developed  HEAD:  Atraumatic, Normocephalic  EYES: EOMI, PERRLA, conjunctiva and sclera clear  ENMT: No tonsillar erythema, exudates, or enlargement; Moist mucous membranes, Good dentition, No lesions  NECK: Supple, No JVD, Normal thyroid  NERVOUS SYSTEM:  Alert & Oriented X3, Good concentration; Motor Strength 5/5 B/L upper and lower extremities; DTRs 2+ intact and symmetric  CHEST/LUNG: Clear to percussion bilaterally; No rales, rhonchi, wheezing, or rubs  HEART: Regular rate and rhythm; No murmurs, rubs, or gallops  ABDOMEN: Soft, Nontender, Nondistended; Bowel sounds present  EXTREMITIES:  2+ Peripheral Pulses, No clubbing, cyanosis, or edema  LYMPH: No lymphadenopathy noted  SKIN: No rashes or lesions    LABS:                        11.8   8.45  )-----------( 183      ( 14 Feb 2019 07:16 )             38.8     02-14    137  |  102  |  78<H>  ----------------------------<  88  5.5<H>   |  20<L>  |  15.40<H>    Ca    9.8      14 Feb 2019 07:16  Phos  6.1     02-14  Mg     2.6     02-14          CAPILLARY BLOOD GLUCOSE          RADIOLOGY & ADDITIONAL TESTS:    Imaging Personally Reviewed:  [ X] YES  [ ] NO    Consultant(s) Notes Reviewed:  [ X] YES  [ ] NO    Care Discussed with Consultants/Other Providers [X ] YES  [ ] NO

## 2019-02-15 NOTE — DISCHARGE NOTE PROVIDER - NSDCFUADDINST_GEN_ALL_CORE_FT
You may take OTC pain medications such as tylenol for relief of postoperative pain as needed. Call with any concerns.

## 2019-02-21 DIAGNOSIS — R10.9 UNSPECIFIED ABDOMINAL PAIN: ICD-10-CM

## 2019-02-21 DIAGNOSIS — T86.12 KIDNEY TRANSPLANT FAILURE: ICD-10-CM

## 2019-02-21 DIAGNOSIS — K43.0 INCISIONAL HERNIA WITH OBSTRUCTION, WITHOUT GANGRENE: ICD-10-CM

## 2019-02-21 DIAGNOSIS — I12.0 HYPERTENSIVE CHRONIC KIDNEY DISEASE WITH STAGE 5 CHRONIC KIDNEY DISEASE OR END STAGE RENAL DISEASE: ICD-10-CM

## 2019-02-21 DIAGNOSIS — Z99.2 DEPENDENCE ON RENAL DIALYSIS: ICD-10-CM

## 2019-02-21 DIAGNOSIS — N18.6 END STAGE RENAL DISEASE: ICD-10-CM

## 2020-03-04 NOTE — H&P ADULT - DOES THIS PATIENT HAVE A HISTORY OF OR HAS BEEN DX WITH HEART FAILURE?
I called patient and informed her of the appointment on Friday, 3/13/20 at 3:20pm with Dr. Silvio Man at the main clinic.  Joel   no

## 2020-04-25 ENCOUNTER — MESSAGE (OUTPATIENT)
Age: 50
End: 2020-04-25

## 2020-05-15 LAB
SARS-COV-2 IGG SERPL IA-ACNC: 0.2 INDEX
SARS-COV-2 IGG SERPL QL IA: NEGATIVE

## 2022-04-12 ENCOUNTER — TRANSCRIPTION ENCOUNTER (OUTPATIENT)
Age: 52
End: 2022-04-12

## 2022-06-01 NOTE — ED ADULT TRIAGE NOTE - BP NONINVASIVE SYSTOLIC (MM HG)
Patient calling.  She states she needs a referral for Physical therapy/accupuncture for her lower back.  She wants to go to [facility] in [facility].  Please contact patient as soon as possible to discuss.  Thank you   [phone]

## 2022-08-29 NOTE — H&P PST ADULT - GASTROINTESTINAL DETAILS
home soft Partial Purse String (Simple) Text: Given the location of the defect and the characteristics of the surrounding skin a simple purse string closure was deemed most appropriate.  Undermining was performed circumfirentially around the surgical defect.  A purse string suture was then placed and tightened. Wound tension only allowed a partial closure of the circular defect.

## 2022-11-02 ENCOUNTER — RESULT REVIEW (OUTPATIENT)
Age: 52
End: 2022-11-02

## 2024-01-26 ENCOUNTER — NON-APPOINTMENT (OUTPATIENT)
Age: 54
End: 2024-01-26

## 2024-03-27 ENCOUNTER — APPOINTMENT (OUTPATIENT)
Dept: ORTHOPEDIC SURGERY | Facility: CLINIC | Age: 54
End: 2024-03-27
Payer: COMMERCIAL

## 2024-03-27 DIAGNOSIS — M72.2 PLANTAR FASCIAL FIBROMATOSIS: ICD-10-CM

## 2024-03-27 PROCEDURE — 73650 X-RAY EXAM OF HEEL: CPT | Mod: LT

## 2024-03-27 PROCEDURE — 99203 OFFICE O/P NEW LOW 30 MIN: CPT

## 2024-03-27 NOTE — DISCUSSION/SUMMARY
[de-identified] : Patient allowed to gently start resuming activities. Discussed change to medication prescription and usage. Offered cortisone steroid injection. Bracing options discussed with patient. Hyaluronic Acid inj pamphlet given to pt. try topical lidocaine reviewed current medications being used by this patient    Start PT program. heel cups. No NSAIDS, kidney transplant.  follow up with foot/ankle if still symptomatic after PT  03/27/2024    RE:  MARGARET MURIEL   Acct #- [id number]    Attention:  Nurse Reviewer /Medical Director  I am writing this letter as a medical necessity for PT program. Patient has tried analgesics, non-steroid anti-inflammatory agents,  hot or cold compresses,injections of corticosteroids, etc)  which in combination or by themselves has not worked. Based on my patient's condition, I strongly believe that the PT is medically needed.   Thank you for your time and consideration.

## 2024-03-27 NOTE — PHYSICAL EXAM
[5___] : [facility] 5[unfilled]/5 [2+] : posterior tibialis pulse: 2+ [Left] : left calcaneus [There are no fractures, subluxations or dislocations. No significant abnormalities are seen] : There are no fractures, subluxations or dislocations. No significant abnormalities are seen [] : no anterior tibial tendon tenderness [TWNoteComboBox7] : dorsiflexion 15 degrees

## 2024-03-27 NOTE — HISTORY OF PRESENT ILLNESS
[Left Leg] : left leg [Gradual] : gradual [4] : 4 [5] : 5 [Intermittent] : intermittent [Stabbing] : stabbing [Exercising] : exercising [Rest] : rest [de-identified] : 53 year old female with pain in the left heel, symptoms started about three-four weeks ago. Pain at the bottom of her heel.  [] : This patient has had an injection before: no [FreeTextEntry5] : Pt has had a gradual onset of left heel painf or the past 2-3 weeks, pt says she has pain while walking  [de-identified] : nilam

## 2024-04-15 NOTE — H&P ADULT - I WAS PHYSICALLY PRESENT FOR THE KEY PORTIONS OF THE EVALUATION AND MANAGEMENT (E/M) SERVICE PROVIDED.  I AGREE WITH THE ABOVE HISTORY, PHYSICAL, AND PLAN WHICH I HAVE REVIEWED AND EDITED WHERE APPROPRIATE
Initial SHAY/CM Assessment/Plan of Care Note     Baseline Assessment  37 year old admitted 4/14/2024 as Observation with a diagnosis of chest pain. Prior to admission patient was living with Children and residing at House    .  Patient does not  have a Power of  for Healthcare. Patient’s Primary Care Provider is Pablo Dimas MD.     Progress Note  SW consult received. Chart review completed. SW introduced self and role. Patient agreeable to meet with SW. Patient address, phone number and PCP verified as correct on the facesheet.    Patient lives at home with her two children (ages 14, 15). She is independent at baseline and typically drives herself to and from appointments. Patients goal is to return home at discharge and she anticipates her mother will provide transportation at discharge.    Offered, educated on and provided Power of  for Healthcare document. Patient does not wish to complete at this time and is aware to request to see  if needs assistance with completion.    In the event home care is recommended, patient would be agreeable to home care services. She prefers [facility] [place] affiliation acknowledged. SW will monitor recommendations and follow up as appropriate.     SW will continue to follow.    Plan  Patient/Family Discharge Goal: Home   Is patient/family goal achievable: Yes    SW/CM - Recommendations for Discharge: Home     Barriers to Discharge  Identified Barriers to Discharge/Transition Planning:          Anticipate patient will need post-hospital services. Necessary services are available.  Anticipate patient can return to the environment from which patient entered the hospital.   Anticipate patient can provide self-care at discharge.    Refer to SW/CM Flowsheet for objective data.     Medical History  Past Medical History:   Diagnosis Date    Bronchitis     COPD (chronic obstructive pulmonary disease) (CMD)     Depression     DM2 (diabetes mellitus, type 2) (CMD)      Essential (primary) hypertension     History of COVID-19     Myocardial infarction (CMD)     RAD (reactive airway disease)      Prior to Admission Status  Functional Status  Ambulation: Independent/Self  Bathing: Independent/Self  Dressing: Independent/Self  Toileting: Independent/Self  Meal Preparation: Independent/Self  Shopping: Independent/Self  Medication Preparation: Independent/Self, Pill planner box  Medication Administration: Independent/Self  Transportation: Independent/Self, Family    Agency/Support  Type of Services Prior to Hospitalization: None               Support Systems: Parent   Home Devices/Equipment: Blood glucose monitor, Blood pressure monitor         Mobility Assist Devices: None  Sensory Support Devices: Eyeglasses    Insurance  Primary: [employer]/BCBS EXCHANGE  Secondary: [facility] [employer]    Disposition Recommendations:  SW/CM recommendation for discharge: Home     Campbell RANDALL, [place]  Phone: [phone]         Statement Selected

## 2024-07-27 ENCOUNTER — NON-APPOINTMENT (OUTPATIENT)
Age: 54
End: 2024-07-27

## 2024-07-29 ENCOUNTER — APPOINTMENT (OUTPATIENT)
Dept: RADIOLOGY | Facility: IMAGING CENTER | Age: 54
End: 2024-07-29

## 2025-02-06 ENCOUNTER — TRANSCRIPTION ENCOUNTER (OUTPATIENT)
Age: 55
End: 2025-02-06

## 2025-02-06 ENCOUNTER — NON-APPOINTMENT (OUTPATIENT)
Age: 55
End: 2025-02-06

## 2025-02-06 ENCOUNTER — APPOINTMENT (OUTPATIENT)
Dept: INTERNAL MEDICINE | Facility: CLINIC | Age: 55
End: 2025-02-06

## 2025-02-06 ENCOUNTER — OUTPATIENT (OUTPATIENT)
Dept: OUTPATIENT SERVICES | Facility: HOSPITAL | Age: 55
LOS: 1 days | End: 2025-02-06

## 2025-02-06 DIAGNOSIS — Z99.2 DEPENDENCE ON RENAL DIALYSIS: Chronic | ICD-10-CM

## 2025-02-10 ENCOUNTER — TRANSCRIPTION ENCOUNTER (OUTPATIENT)
Age: 55
End: 2025-02-10

## 2025-02-13 ENCOUNTER — TRANSCRIPTION ENCOUNTER (OUTPATIENT)
Age: 55
End: 2025-02-13

## 2025-02-19 ENCOUNTER — TRANSCRIPTION ENCOUNTER (OUTPATIENT)
Age: 55
End: 2025-02-19

## 2025-02-21 NOTE — ED PROVIDER NOTE - PROGRESS NOTE
Echo 10/23/2023: EF 67%, mild LVH, mild MR, trace TR  Repeat echo in 3 years if patient remains asymptomatic        Stable.

## 2025-03-05 NOTE — ASU PREOP CHECKLIST - HEIGHT IN FEET
Patient states that she was supposed to be sent a RX of amitriptyline. Patient also states that she was supposed to have dosage of another med increased. Please call to discuss.     Elsi Santos on 3/5/2025 at 10:23 AM     5

## 2025-03-26 ENCOUNTER — TRANSCRIPTION ENCOUNTER (OUTPATIENT)
Age: 55
End: 2025-03-26